# Patient Record
Sex: MALE | Race: WHITE | NOT HISPANIC OR LATINO | Employment: FULL TIME | URBAN - METROPOLITAN AREA
[De-identification: names, ages, dates, MRNs, and addresses within clinical notes are randomized per-mention and may not be internally consistent; named-entity substitution may affect disease eponyms.]

---

## 2019-11-30 ENCOUNTER — APPOINTMENT (OUTPATIENT)
Dept: RADIOLOGY | Facility: MEDICAL CENTER | Age: 54
End: 2019-11-30
Attending: EMERGENCY MEDICINE
Payer: OTHER MISCELLANEOUS

## 2019-11-30 ENCOUNTER — HOSPITAL ENCOUNTER (OUTPATIENT)
Facility: MEDICAL CENTER | Age: 54
DRG: 287 | End: 2019-11-30
Payer: OTHER MISCELLANEOUS

## 2019-11-30 ENCOUNTER — HOSPITAL ENCOUNTER (OUTPATIENT)
Facility: MEDICAL CENTER | Age: 54
End: 2019-11-30
Attending: EMERGENCY MEDICINE | Admitting: INTERNAL MEDICINE
Payer: OTHER MISCELLANEOUS

## 2019-11-30 ENCOUNTER — HOSPITAL ENCOUNTER (INPATIENT)
Facility: MEDICAL CENTER | Age: 54
LOS: 2 days | DRG: 287 | End: 2019-12-02
Attending: INTERNAL MEDICINE | Admitting: INTERNAL MEDICINE
Payer: OTHER MISCELLANEOUS

## 2019-11-30 VITALS
RESPIRATION RATE: 18 BRPM | HEIGHT: 73 IN | HEART RATE: 81 BPM | OXYGEN SATURATION: 93 % | WEIGHT: 222 LBS | DIASTOLIC BLOOD PRESSURE: 91 MMHG | BODY MASS INDEX: 29.42 KG/M2 | TEMPERATURE: 98.3 F | SYSTOLIC BLOOD PRESSURE: 135 MMHG

## 2019-11-30 DIAGNOSIS — R07.9 ACUTE CHEST PAIN: ICD-10-CM

## 2019-11-30 DIAGNOSIS — I21.4 NSTEMI (NON-ST ELEVATED MYOCARDIAL INFARCTION) (HCC): ICD-10-CM

## 2019-11-30 PROBLEM — E78.5 DYSLIPIDEMIA: Status: ACTIVE | Noted: 2019-11-30

## 2019-11-30 LAB
ALBUMIN SERPL BCP-MCNC: 4.5 G/DL (ref 3.2–4.9)
ALBUMIN/GLOB SERPL: 1.6 G/DL
ALP SERPL-CCNC: 57 U/L (ref 30–99)
ALT SERPL-CCNC: 38 U/L (ref 2–50)
ANION GAP SERPL CALC-SCNC: 13 MMOL/L (ref 0–11.9)
APTT PPP: 25.6 SEC (ref 24.7–36)
AST SERPL-CCNC: 27 U/L (ref 12–45)
BASOPHILS # BLD AUTO: 0.5 % (ref 0–1.8)
BASOPHILS # BLD: 0.03 K/UL (ref 0–0.12)
BILIRUB SERPL-MCNC: 0.3 MG/DL (ref 0.1–1.5)
BUN SERPL-MCNC: 24 MG/DL (ref 8–22)
CALCIUM SERPL-MCNC: 9.9 MG/DL (ref 8.4–10.2)
CHLORIDE SERPL-SCNC: 101 MMOL/L (ref 96–112)
CO2 SERPL-SCNC: 25 MMOL/L (ref 20–33)
CREAT SERPL-MCNC: 0.92 MG/DL (ref 0.5–1.4)
EKG IMPRESSION: NORMAL
EOSINOPHIL # BLD AUTO: 0.07 K/UL (ref 0–0.51)
EOSINOPHIL NFR BLD: 1.3 % (ref 0–6.9)
ERYTHROCYTE [DISTWIDTH] IN BLOOD BY AUTOMATED COUNT: 39.8 FL (ref 35.9–50)
GLOBULIN SER CALC-MCNC: 2.9 G/DL (ref 1.9–3.5)
GLUCOSE SERPL-MCNC: 128 MG/DL (ref 65–99)
HCT VFR BLD AUTO: 46.7 % (ref 42–52)
HGB BLD-MCNC: 16 G/DL (ref 14–18)
IMM GRANULOCYTES # BLD AUTO: 0.02 K/UL (ref 0–0.11)
IMM GRANULOCYTES NFR BLD AUTO: 0.4 % (ref 0–0.9)
INR PPP: 1 (ref 0.87–1.13)
LYMPHOCYTES # BLD AUTO: 1.6 K/UL (ref 1–4.8)
LYMPHOCYTES NFR BLD: 28.9 % (ref 22–41)
MCH RBC QN AUTO: 30.6 PG (ref 27–33)
MCHC RBC AUTO-ENTMCNC: 34.3 G/DL (ref 33.7–35.3)
MCV RBC AUTO: 89.3 FL (ref 81.4–97.8)
MONOCYTES # BLD AUTO: 0.93 K/UL (ref 0–0.85)
MONOCYTES NFR BLD AUTO: 16.8 % (ref 0–13.4)
NEUTROPHILS # BLD AUTO: 2.88 K/UL (ref 1.82–7.42)
NEUTROPHILS NFR BLD: 52.1 % (ref 44–72)
NRBC # BLD AUTO: 0 K/UL
NRBC BLD-RTO: 0 /100 WBC
PLATELET # BLD AUTO: 165 K/UL (ref 164–446)
PMV BLD AUTO: 10 FL (ref 9–12.9)
POTASSIUM SERPL-SCNC: 4.6 MMOL/L (ref 3.6–5.5)
PROT SERPL-MCNC: 7.4 G/DL (ref 6–8.2)
PROTHROMBIN TIME: 13.3 SEC (ref 12–14.6)
RBC # BLD AUTO: 5.23 M/UL (ref 4.7–6.1)
SODIUM SERPL-SCNC: 139 MMOL/L (ref 135–145)
TROPONIN T SERPL-MCNC: 13 NG/L (ref 6–19)
TROPONIN T SERPL-MCNC: 98 NG/L (ref 6–19)
WBC # BLD AUTO: 5.5 K/UL (ref 4.8–10.8)

## 2019-11-30 PROCEDURE — 700102 HCHG RX REV CODE 250 W/ 637 OVERRIDE(OP): Performed by: INTERNAL MEDICINE

## 2019-11-30 PROCEDURE — 96374 THER/PROPH/DIAG INJ IV PUSH: CPT

## 2019-11-30 PROCEDURE — A9270 NON-COVERED ITEM OR SERVICE: HCPCS | Performed by: INTERNAL MEDICINE

## 2019-11-30 PROCEDURE — 84484 ASSAY OF TROPONIN QUANT: CPT

## 2019-11-30 PROCEDURE — 96376 TX/PRO/DX INJ SAME DRUG ADON: CPT

## 2019-11-30 PROCEDURE — 85730 THROMBOPLASTIN TIME PARTIAL: CPT

## 2019-11-30 PROCEDURE — 36415 COLL VENOUS BLD VENIPUNCTURE: CPT

## 2019-11-30 PROCEDURE — 770020 HCHG ROOM/CARE - TELE (206)

## 2019-11-30 PROCEDURE — 93005 ELECTROCARDIOGRAM TRACING: CPT | Performed by: EMERGENCY MEDICINE

## 2019-11-30 PROCEDURE — 700111 HCHG RX REV CODE 636 W/ 250 OVERRIDE (IP): Performed by: INTERNAL MEDICINE

## 2019-11-30 PROCEDURE — 99285 EMERGENCY DEPT VISIT HI MDM: CPT

## 2019-11-30 PROCEDURE — 80053 COMPREHEN METABOLIC PANEL: CPT

## 2019-11-30 PROCEDURE — G0378 HOSPITAL OBSERVATION PER HR: HCPCS

## 2019-11-30 PROCEDURE — 85610 PROTHROMBIN TIME: CPT

## 2019-11-30 PROCEDURE — 99255 IP/OBS CONSLTJ NEW/EST HI 80: CPT | Performed by: INTERNAL MEDICINE

## 2019-11-30 PROCEDURE — 99220 PR INITIAL OBSERVATION CARE,LEVL III: CPT | Performed by: INTERNAL MEDICINE

## 2019-11-30 PROCEDURE — 71045 X-RAY EXAM CHEST 1 VIEW: CPT

## 2019-11-30 PROCEDURE — 85025 COMPLETE CBC W/AUTO DIFF WBC: CPT

## 2019-11-30 PROCEDURE — 93005 ELECTROCARDIOGRAM TRACING: CPT | Performed by: INTERNAL MEDICINE

## 2019-11-30 RX ORDER — PROMETHAZINE HYDROCHLORIDE 25 MG/1
12.5-25 SUPPOSITORY RECTAL EVERY 4 HOURS PRN
Status: DISCONTINUED | OUTPATIENT
Start: 2019-11-30 | End: 2019-11-30 | Stop reason: HOSPADM

## 2019-11-30 RX ORDER — ATORVASTATIN CALCIUM 80 MG/1
80 TABLET, FILM COATED ORAL DAILY
Status: DISCONTINUED | OUTPATIENT
Start: 2019-11-30 | End: 2019-12-02

## 2019-11-30 RX ORDER — ASPIRIN 81 MG/1
324 TABLET, CHEWABLE ORAL DAILY
Status: DISCONTINUED | OUTPATIENT
Start: 2019-11-30 | End: 2019-12-01

## 2019-11-30 RX ORDER — ENALAPRILAT 1.25 MG/ML
1.25 INJECTION INTRAVENOUS EVERY 6 HOURS PRN
Status: DISCONTINUED | OUTPATIENT
Start: 2019-11-30 | End: 2019-11-30 | Stop reason: HOSPADM

## 2019-11-30 RX ORDER — ONDANSETRON 4 MG/1
4 TABLET, ORALLY DISINTEGRATING ORAL EVERY 4 HOURS PRN
Status: CANCELLED | OUTPATIENT
Start: 2019-11-30

## 2019-11-30 RX ORDER — ASPIRIN 600 MG/1
300 SUPPOSITORY RECTAL DAILY
Status: DISCONTINUED | OUTPATIENT
Start: 2019-11-30 | End: 2019-11-30 | Stop reason: HOSPADM

## 2019-11-30 RX ORDER — PROCHLORPERAZINE EDISYLATE 5 MG/ML
5-10 INJECTION INTRAMUSCULAR; INTRAVENOUS EVERY 4 HOURS PRN
Status: CANCELLED | OUTPATIENT
Start: 2019-11-30

## 2019-11-30 RX ORDER — HEPARIN SODIUM 1000 [USP'U]/ML
7000 INJECTION, SOLUTION INTRAVENOUS; SUBCUTANEOUS ONCE
Status: COMPLETED | OUTPATIENT
Start: 2019-11-30 | End: 2019-11-30

## 2019-11-30 RX ORDER — CARBAMAZEPINE 200 MG/1
200 TABLET ORAL EVERY EVENING
Status: DISCONTINUED | OUTPATIENT
Start: 2019-11-30 | End: 2019-11-30 | Stop reason: HOSPADM

## 2019-11-30 RX ORDER — ACETAMINOPHEN 325 MG/1
650 TABLET ORAL EVERY 6 HOURS PRN
Status: DISCONTINUED | OUTPATIENT
Start: 2019-11-30 | End: 2019-12-02 | Stop reason: HOSPADM

## 2019-11-30 RX ORDER — ASPIRIN 325 MG
325 TABLET ORAL DAILY
Status: DISCONTINUED | OUTPATIENT
Start: 2019-11-30 | End: 2019-11-30 | Stop reason: HOSPADM

## 2019-11-30 RX ORDER — ACETAMINOPHEN 325 MG/1
650 TABLET ORAL EVERY 6 HOURS PRN
Status: DISCONTINUED | OUTPATIENT
Start: 2019-11-30 | End: 2019-11-30 | Stop reason: HOSPADM

## 2019-11-30 RX ORDER — PROMETHAZINE HYDROCHLORIDE 25 MG/1
12.5-25 TABLET ORAL EVERY 4 HOURS PRN
Status: CANCELLED | OUTPATIENT
Start: 2019-11-30

## 2019-11-30 RX ORDER — PROMETHAZINE HYDROCHLORIDE 25 MG/1
12.5-25 TABLET ORAL EVERY 4 HOURS PRN
Status: DISCONTINUED | OUTPATIENT
Start: 2019-11-30 | End: 2019-12-02 | Stop reason: HOSPADM

## 2019-11-30 RX ORDER — ONDANSETRON 4 MG/1
4 TABLET, ORALLY DISINTEGRATING ORAL EVERY 4 HOURS PRN
Status: DISCONTINUED | OUTPATIENT
Start: 2019-11-30 | End: 2019-12-02 | Stop reason: HOSPADM

## 2019-11-30 RX ORDER — PROMETHAZINE HYDROCHLORIDE 25 MG/1
12.5-25 SUPPOSITORY RECTAL EVERY 4 HOURS PRN
Status: DISCONTINUED | OUTPATIENT
Start: 2019-11-30 | End: 2019-12-02 | Stop reason: HOSPADM

## 2019-11-30 RX ORDER — PROCHLORPERAZINE EDISYLATE 5 MG/ML
5-10 INJECTION INTRAMUSCULAR; INTRAVENOUS EVERY 4 HOURS PRN
Status: DISCONTINUED | OUTPATIENT
Start: 2019-11-30 | End: 2019-11-30 | Stop reason: HOSPADM

## 2019-11-30 RX ORDER — ONDANSETRON 2 MG/ML
4 INJECTION INTRAMUSCULAR; INTRAVENOUS EVERY 4 HOURS PRN
Status: DISCONTINUED | OUTPATIENT
Start: 2019-11-30 | End: 2019-12-02 | Stop reason: HOSPADM

## 2019-11-30 RX ORDER — HEPARIN SODIUM 5000 [USP'U]/100ML
INJECTION, SOLUTION INTRAVENOUS CONTINUOUS
Status: DISCONTINUED | OUTPATIENT
Start: 2019-11-30 | End: 2019-11-30 | Stop reason: HOSPADM

## 2019-11-30 RX ORDER — ENALAPRILAT 1.25 MG/ML
1.25 INJECTION INTRAVENOUS EVERY 6 HOURS PRN
Status: DISCONTINUED | OUTPATIENT
Start: 2019-11-30 | End: 2019-12-02 | Stop reason: HOSPADM

## 2019-11-30 RX ORDER — CARBAMAZEPINE 200 MG/1
200 TABLET ORAL EVERY EVENING
Status: CANCELLED | OUTPATIENT
Start: 2019-11-30

## 2019-11-30 RX ORDER — ASPIRIN 81 MG/1
324 TABLET, CHEWABLE ORAL DAILY
Status: CANCELLED | OUTPATIENT
Start: 2019-12-01

## 2019-11-30 RX ORDER — ASPIRIN 325 MG
325 TABLET ORAL DAILY
Status: DISCONTINUED | OUTPATIENT
Start: 2019-11-30 | End: 2019-12-01

## 2019-11-30 RX ORDER — ACETAMINOPHEN 325 MG/1
650 TABLET ORAL EVERY 6 HOURS PRN
Status: CANCELLED | OUTPATIENT
Start: 2019-11-30

## 2019-11-30 RX ORDER — PROCHLORPERAZINE EDISYLATE 5 MG/ML
5-10 INJECTION INTRAMUSCULAR; INTRAVENOUS EVERY 4 HOURS PRN
Status: DISCONTINUED | OUTPATIENT
Start: 2019-11-30 | End: 2019-12-02 | Stop reason: HOSPADM

## 2019-11-30 RX ORDER — ONDANSETRON 4 MG/1
4 TABLET, ORALLY DISINTEGRATING ORAL EVERY 4 HOURS PRN
Status: DISCONTINUED | OUTPATIENT
Start: 2019-11-30 | End: 2019-11-30 | Stop reason: HOSPADM

## 2019-11-30 RX ORDER — ONDANSETRON 2 MG/ML
4 INJECTION INTRAMUSCULAR; INTRAVENOUS EVERY 4 HOURS PRN
Status: CANCELLED | OUTPATIENT
Start: 2019-11-30

## 2019-11-30 RX ORDER — REGADENOSON 0.08 MG/ML
0.4 INJECTION, SOLUTION INTRAVENOUS
Status: DISCONTINUED | OUTPATIENT
Start: 2019-11-30 | End: 2019-11-30

## 2019-11-30 RX ORDER — ASPIRIN 300 MG/1
300 SUPPOSITORY RECTAL DAILY
Status: DISCONTINUED | OUTPATIENT
Start: 2019-11-30 | End: 2019-12-01

## 2019-11-30 RX ORDER — ASPIRIN 325 MG
325 TABLET ORAL DAILY
Status: CANCELLED | OUTPATIENT
Start: 2019-12-01

## 2019-11-30 RX ORDER — ASPIRIN 600 MG/1
300 SUPPOSITORY RECTAL DAILY
Status: CANCELLED | OUTPATIENT
Start: 2019-12-01

## 2019-11-30 RX ORDER — HEPARIN SODIUM 1000 [USP'U]/ML
3800 INJECTION, SOLUTION INTRAVENOUS; SUBCUTANEOUS PRN
Status: DISCONTINUED | OUTPATIENT
Start: 2019-11-30 | End: 2019-11-30 | Stop reason: HOSPADM

## 2019-11-30 RX ORDER — ASPIRIN 81 MG/1
324 TABLET, CHEWABLE ORAL DAILY
Status: DISCONTINUED | OUTPATIENT
Start: 2019-11-30 | End: 2019-11-30 | Stop reason: HOSPADM

## 2019-11-30 RX ORDER — PROMETHAZINE HYDROCHLORIDE 25 MG/1
12.5-25 SUPPOSITORY RECTAL EVERY 4 HOURS PRN
Status: CANCELLED | OUTPATIENT
Start: 2019-11-30

## 2019-11-30 RX ORDER — CARBAMAZEPINE 200 MG/1
200 TABLET ORAL EVERY EVENING
COMMUNITY
End: 2020-02-28 | Stop reason: SDUPTHER

## 2019-11-30 RX ORDER — TADALAFIL 10 MG
10 TABLET ORAL PRN
COMMUNITY
End: 2021-08-09 | Stop reason: SDUPTHER

## 2019-11-30 RX ORDER — ONDANSETRON 2 MG/ML
4 INJECTION INTRAMUSCULAR; INTRAVENOUS EVERY 4 HOURS PRN
Status: DISCONTINUED | OUTPATIENT
Start: 2019-11-30 | End: 2019-11-30 | Stop reason: HOSPADM

## 2019-11-30 RX ORDER — HEPARIN SODIUM 1000 [USP'U]/ML
3800 INJECTION, SOLUTION INTRAVENOUS; SUBCUTANEOUS PRN
Status: DISCONTINUED | OUTPATIENT
Start: 2019-11-30 | End: 2019-12-01

## 2019-11-30 RX ORDER — ENALAPRILAT 1.25 MG/ML
1.25 INJECTION INTRAVENOUS EVERY 6 HOURS PRN
Status: CANCELLED | OUTPATIENT
Start: 2019-11-30

## 2019-11-30 RX ORDER — CARBAMAZEPINE 200 MG/1
200 TABLET ORAL EVERY EVENING
Status: DISCONTINUED | OUTPATIENT
Start: 2019-12-01 | End: 2019-12-02 | Stop reason: HOSPADM

## 2019-11-30 RX ORDER — HEPARIN SODIUM 5000 [USP'U]/100ML
INJECTION, SOLUTION INTRAVENOUS CONTINUOUS
Status: DISCONTINUED | OUTPATIENT
Start: 2019-11-30 | End: 2019-12-01

## 2019-11-30 RX ORDER — PROMETHAZINE HYDROCHLORIDE 25 MG/1
12.5-25 TABLET ORAL EVERY 4 HOURS PRN
Status: DISCONTINUED | OUTPATIENT
Start: 2019-11-30 | End: 2019-11-30 | Stop reason: HOSPADM

## 2019-11-30 RX ORDER — AMINOPHYLLINE 25 MG/ML
100 INJECTION, SOLUTION INTRAVENOUS
Status: DISCONTINUED | OUTPATIENT
Start: 2019-11-30 | End: 2019-11-30 | Stop reason: HOSPADM

## 2019-11-30 RX ADMIN — HEPARIN SODIUM 25000 UNITS: 5000 INJECTION, SOLUTION INTRAVENOUS at 18:14

## 2019-11-30 RX ADMIN — CARBAMAZEPINE 200 MG: 200 TABLET ORAL at 18:09

## 2019-11-30 RX ADMIN — ATORVASTATIN CALCIUM 80 MG: 80 TABLET, FILM COATED ORAL at 22:37

## 2019-11-30 RX ADMIN — ASPIRIN 325 MG: 325 TABLET, FILM COATED ORAL at 21:13

## 2019-11-30 RX ADMIN — HEPARIN SODIUM 7000 UNITS: 1000 INJECTION, SOLUTION INTRAVENOUS; SUBCUTANEOUS at 18:10

## 2019-11-30 SDOH — HEALTH STABILITY: MENTAL HEALTH: HOW OFTEN DO YOU HAVE 6 OR MORE DRINKS ON ONE OCCASION?: NEVER

## 2019-11-30 SDOH — ECONOMIC STABILITY: TRANSPORTATION INSECURITY
IN THE PAST 12 MONTHS, HAS THE LACK OF TRANSPORTATION KEPT YOU FROM MEDICAL APPOINTMENTS OR FROM GETTING MEDICATIONS?: NO

## 2019-11-30 SDOH — ECONOMIC STABILITY: FOOD INSECURITY: WITHIN THE PAST 12 MONTHS, THE FOOD YOU BOUGHT JUST DIDN'T LAST AND YOU DIDN'T HAVE MONEY TO GET MORE.: NEVER TRUE

## 2019-11-30 SDOH — HEALTH STABILITY: MENTAL HEALTH: HOW MANY STANDARD DRINKS CONTAINING ALCOHOL DO YOU HAVE ON A TYPICAL DAY?: 1 OR 2

## 2019-11-30 SDOH — ECONOMIC STABILITY: FOOD INSECURITY: WITHIN THE PAST 12 MONTHS, YOU WORRIED THAT YOUR FOOD WOULD RUN OUT BEFORE YOU GOT MONEY TO BUY MORE.: NEVER TRUE

## 2019-11-30 SDOH — HEALTH STABILITY: MENTAL HEALTH: HOW OFTEN DO YOU HAVE A DRINK CONTAINING ALCOHOL?: MONTHLY OR LESS

## 2019-11-30 SDOH — ECONOMIC STABILITY: TRANSPORTATION INSECURITY
IN THE PAST 12 MONTHS, HAS LACK OF TRANSPORTATION KEPT YOU FROM MEETINGS, WORK, OR FROM GETTING THINGS NEEDED FOR DAILY LIVING?: NO

## 2019-11-30 ASSESSMENT — COGNITIVE AND FUNCTIONAL STATUS - GENERAL
SUGGESTED CMS G CODE MODIFIER DAILY ACTIVITY: CH
MOBILITY SCORE: 24
SUGGESTED CMS G CODE MODIFIER MOBILITY: CH
MOBILITY SCORE: 24
DAILY ACTIVITIY SCORE: 24
DAILY ACTIVITIY SCORE: 24
SUGGESTED CMS G CODE MODIFIER DAILY ACTIVITY: CH
SUGGESTED CMS G CODE MODIFIER MOBILITY: CH

## 2019-11-30 ASSESSMENT — LIFESTYLE VARIABLES
EVER FELT BAD OR GUILTY ABOUT YOUR DRINKING: NO
EVER_SMOKED: NEVER
HAVE PEOPLE ANNOYED YOU BY CRITICIZING YOUR DRINKING: NO
HOW MANY TIMES IN THE PAST YEAR HAVE YOU HAD 5 OR MORE DRINKS IN A DAY: 0
ALCOHOL_USE: NO
TOTAL SCORE: 0
AVERAGE NUMBER OF DAYS PER WEEK YOU HAVE A DRINK CONTAINING ALCOHOL: 0
TOTAL SCORE: 0
EVER_SMOKED: NEVER
EVER HAD A DRINK FIRST THING IN THE MORNING TO STEADY YOUR NERVES TO GET RID OF A HANGOVER: NO
CONSUMPTION TOTAL: NEGATIVE
TOTAL SCORE: 0
HOW MANY TIMES IN THE PAST YEAR HAVE YOU HAD 5 OR MORE DRINKS IN A DAY: 0
AVERAGE NUMBER OF DAYS PER WEEK YOU HAVE A DRINK CONTAINING ALCOHOL: 0
TOTAL SCORE: 0
HAVE PEOPLE ANNOYED YOU BY CRITICIZING YOUR DRINKING: NO
ALCOHOL_USE: YES
ON A TYPICAL DAY WHEN YOU DRINK ALCOHOL HOW MANY DRINKS DO YOU HAVE: 2
EVER FELT BAD OR GUILTY ABOUT YOUR DRINKING: NO
CONSUMPTION TOTAL: NEGATIVE
DOES PATIENT WANT TO STOP DRINKING: NO
TOTAL SCORE: 0
DOES PATIENT WANT TO STOP DRINKING: NO
TOTAL SCORE: 0
HAVE YOU EVER FELT YOU SHOULD CUT DOWN ON YOUR DRINKING: NO
ON A TYPICAL DAY WHEN YOU DRINK ALCOHOL HOW MANY DRINKS DO YOU HAVE: 0
EVER HAD A DRINK FIRST THING IN THE MORNING TO STEADY YOUR NERVES TO GET RID OF A HANGOVER: NO
HAVE YOU EVER FELT YOU SHOULD CUT DOWN ON YOUR DRINKING: NO

## 2019-11-30 ASSESSMENT — ENCOUNTER SYMPTOMS
ORTHOPNEA: 0
CONSTIPATION: 0
SHORTNESS OF BREATH: 0
FEVER: 0
NERVOUS/ANXIOUS: 0
VOMITING: 0
HEADACHES: 1
NAUSEA: 0
TINGLING: 1
MYALGIAS: 0
CHILLS: 0
PALPITATIONS: 0
FOCAL WEAKNESS: 0
STRIDOR: 0
SEIZURES: 0
WHEEZING: 0
DIAPHORESIS: 0
ABDOMINAL PAIN: 0
SORE THROAT: 0
INSOMNIA: 0
HEARTBURN: 0
TREMORS: 0
DIZZINESS: 0
BACK PAIN: 0
COUGH: 0

## 2019-11-30 ASSESSMENT — PATIENT HEALTH QUESTIONNAIRE - PHQ9
1. LITTLE INTEREST OR PLEASURE IN DOING THINGS: NOT AT ALL
2. FEELING DOWN, DEPRESSED, IRRITABLE, OR HOPELESS: NOT AT ALL
1. LITTLE INTEREST OR PLEASURE IN DOING THINGS: NOT AT ALL
SUM OF ALL RESPONSES TO PHQ9 QUESTIONS 1 AND 2: 0
SUM OF ALL RESPONSES TO PHQ9 QUESTIONS 1 AND 2: 0
2. FEELING DOWN, DEPRESSED, IRRITABLE, OR HOPELESS: NOT AT ALL

## 2019-11-30 NOTE — ASSESSMENT & PLAN NOTE
The patient states he stopped statin therapy as it did not lower his cholesterol, I will repeat a fasting cholesterol test in the morning to see where his levels are

## 2019-11-30 NOTE — PROGRESS NOTES
2 RN skin check complete with Mervat  Devices in place: none  No areas for concern. Patient ambulatory.

## 2019-11-30 NOTE — H&P
"Hospital Medicine History & Physical Note    Date of Service  11/30/2019    Primary Care Physician  None    Consultants  none    Code Status  full    Chief Complaint  Chest pain    History of Presenting Illness  54 y.o. male who presented 11/30/2019 with chest pain while breaking up ice in front of his house. He was shoveling snow yesterday and states it was much more strenuous and had not chest pain all day yesterday. Today he was breaking up ice on the ground and had acute onset of mid sternal chest pain that was pressure-like in sensation and radiated into both of his arms. In his forearms  He felt tingling and an \"aching\" sensation. He had some flushing of his face. He denies any diaphoresis, shortness of breath or nausea. He did have a mild headache with the incident.  Symptoms lasted 10 minutes and after taking aspirin given to him by paramedics his pain started to improve. He was then given nitroglycerin and his pain slowly resolved completely. Overall it lasted about 20 minutes. He denies ever having pain like this in the past.    Review of Systems  Review of Systems   Constitutional: Negative for chills, diaphoresis, fever and malaise/fatigue.        Flushing with chest pain   HENT: Negative for congestion and sore throat.    Respiratory: Negative for cough, shortness of breath, wheezing and stridor.    Cardiovascular: Positive for chest pain. Negative for palpitations, orthopnea and leg swelling.   Gastrointestinal: Negative for abdominal pain, constipation, heartburn, nausea and vomiting.   Genitourinary: Negative for frequency, hematuria and urgency.   Musculoskeletal: Negative for back pain and myalgias.        Arm aching and tingling with chest pain   Skin: Negative for itching and rash.   Neurological: Positive for tingling and headaches. Negative for dizziness, tremors, focal weakness and seizures.   Psychiatric/Behavioral: The patient is not nervous/anxious and does not have insomnia.    All other " systems reviewed and are negative.      Past Medical History   has a past medical history of Seizure (HCC).seizure due to a cerebral cavernous angioma    Surgical History  No surgery    Family History  Father had heart disease diagnosed in his 50's, paternal grandfather had heart disease    Social History   reports that he has never smoked. He does not have any smokeless tobacco history on file. He reports current alcohol use. He reports that he does not use drugs.He drinks one glass of wine weekly    Allergies  No Known Allergies    Medications  Prior to Admission Medications   Prescriptions Last Dose Informant Patient Reported? Taking?   Multiple Vitamins-Minerals (MULTIVITAMIN PO) 11/26/2019 at am Patient Yes Yes   Sig: Take 1 Tab by mouth every day.   carBAMazepine (TEGRETOL) 200 MG Tab 11/29/2019 at 2000 Patient Yes Yes   Sig: Take 200 mg by mouth every evening.   tadalafil (CIALIS) 10 MG tablet 11/29/2019 at 1900 Patient Yes Yes   Sig: Take 10 mg by mouth as needed for Erectile Dysfunction.      Facility-Administered Medications: None       Physical Exam  Temp:  [36.6 °C (97.9 °F)] 36.6 °C (97.9 °F)  Pulse:  [80-88] 88  Resp:  [12-51] 21  BP: (130-152)/(62-95) 152/84  SpO2:  [92 %-96 %] 96 %    Physical Exam  Vitals signs and nursing note reviewed.   Constitutional:       Appearance: He is not ill-appearing or diaphoretic.   HENT:      Head: Atraumatic.      Comments: Scar over left forehead      Nose: No congestion or rhinorrhea.      Mouth/Throat:      Mouth: Mucous membranes are dry.      Pharynx: No posterior oropharyngeal erythema.   Eyes:      Extraocular Movements: Extraocular movements intact.      Pupils: Pupils are equal, round, and reactive to light.   Neck:      Musculoskeletal: Neck supple. No muscular tenderness.   Cardiovascular:      Rate and Rhythm: Normal rate and regular rhythm.      Pulses: Normal pulses.      Heart sounds: Normal heart sounds. No murmur.   Pulmonary:      Effort: Pulmonary  effort is normal. No respiratory distress.      Breath sounds: Normal breath sounds. No wheezing.   Abdominal:      General: There is no distension.      Palpations: Abdomen is soft. There is no mass.      Tenderness: There is no tenderness.   Musculoskeletal:         General: No swelling, tenderness or signs of injury.   Skin:     General: Skin is warm and dry.      Coloration: Skin is not jaundiced.   Neurological:      General: No focal deficit present.      Mental Status: He is alert and oriented to person, place, and time.      Motor: No weakness.      Coordination: Coordination normal.   Psychiatric:         Mood and Affect: Mood normal.         Behavior: Behavior normal.         Thought Content: Thought content normal.         Laboratory:  Recent Labs     11/30/19  1040   WBC 5.5   RBC 5.23   HEMOGLOBIN 16.0   HEMATOCRIT 46.7   MCV 89.3   MCH 30.6   MCHC 34.3   RDW 39.8   PLATELETCT 165   MPV 10.0     Recent Labs     11/30/19  1040   SODIUM 139   POTASSIUM 4.6   CHLORIDE 101   CO2 25   GLUCOSE 128*   BUN 24*   CREATININE 0.92   CALCIUM 9.9     Recent Labs     11/30/19  1040   ALTSGPT 38   ASTSGOT 27   ALKPHOSPHAT 57   TBILIRUBIN 0.3   GLUCOSE 128*         No results for input(s): NTPROBNP in the last 72 hours.      Recent Labs     11/30/19  1040   TROPONINT 13       Urinalysis:    No results found     Imaging:  DX-CHEST-PORTABLE (1 VIEW)   Final Result      Negative single view of the chest.      NM-CARDIAC STRESS TEST    (Results Pending)         Assessment/Plan:  I anticipate this patient will require at least two midnights for appropriate medical management, necessitating inpatient admission.    Chest pain  Assessment & Plan  Concerning for inducible ischemia given the patient's symptoms  He did take cialis last night at 7:30pm and this must be 72 hours later for a chemical stress test, I reviewed this with nuclear medicine, Eros and will order for a treadmill test with images which can be done tomorrow as  he also drank caffeinated coffee this morning at 7am  I will repeat a troponin as his first one is normal to ensure no active ischemia    Dyslipidemia  Assessment & Plan  The patient states he stopped statin therapy as it did not lower his cholesterol, I will repeat a fasting cholesterol test in the morning to see where his levels are    Addendum: the patient had ekg changes with interventricular conduction delay as reviewed by myself and rising troponin to the 90's consistent with a type 2 MI, I called Dr. Kapil Deng who agrees with heparin drip and transfer to [facility] for cardiac catheterization    VTE prophylaxis: ambulation

## 2019-11-30 NOTE — ASSESSMENT & PLAN NOTE
Concerning for inducible ischemia given the patient's symptoms  He did take cialis last night at 7:30pm and this must be 72 hours later for a chemical stress test, I reviewed this with nuclear medicine, Eros and will order for a treadmill test with images which can be done tomorrow as he also drank caffeinated coffee this morning at 7am  I will repeat a troponin as his first one is normal to ensure no active ischemia

## 2019-11-30 NOTE — ED PROVIDER NOTES
ED Provider Note    CHIEF COMPLAINT  Chief Complaint   Patient presents with   • Chest Pain     chest pain started while shoveling snow       [facility]  Jaja Lugo is a 54 y.o. male who presents with a history of high cholesterol, he was taking a statin but it was not effective for him and he is currently not on high cholesterol medications, he denies any other medical history, he does not smoke, he reports his father  from heart disease but his father was also diabetic.  The patient was shoveling snow today and had substernal chest pain that felt like a cramp that lasted at least 10 to 15 minutes.  He was diaphoretic but was not sure if that was from coming in out of the cold, he denied shortness of breath or nausea.  He called 911 and was given aspirin and nitro.  Currently he is chest pain-free.  He has never had a stress test before.  He also describes that he had tingling in both his hands during the chest pain.  He denies hemoptysis, he denies leg swelling, he denies any focal neurologic deficits besides tingling in both hands.    REVIEW OF SYSTEMS  See HPI for further details. All other systems are negative.     PAST MEDICAL HISTORY   has a past medical history of Seizure (HCC).    SOCIAL HISTORY  Social History     Tobacco Use   • Smoking status: Never Smoker   Substance and Sexual Activity   • Alcohol use: Yes     Frequency: Monthly or less   • Drug use: Never   • Sexual activity: Not on file       SURGICAL HISTORY  patient denies any surgical history    CURRENT MEDICATIONS  Home Medications     Reviewed by Abi Keith (Pharmacy Tech) on 19 at 1056  Med List Status: Complete   Medication Last Dose Status   carBAMazepine (TEGRETOL) 200 MG Tab 2019 Active   Multiple Vitamins-Minerals (MULTIVITAMIN PO) 2019 Active   tadalafil (CIALIS) 10 MG tablet 2019 Active                  ALLERGIES  No Known Allergies    PHYSICAL EXAM  VITAL SIGNS: /79   Pulse 82   Temp 36.6  °C (97.9 °F) (Temporal)   Resp 12   Wt 95 kg (209 lb 7 oz)   SpO2 96%  @MIRELA[[id number]::@   Pulse ox interpretation: I interpret this pulse ox as normal.  Constitutional: Alert in no apparent distress.  HENT: No signs of trauma, Bilateral external ears normal, Nose normal.   Eyes: Pupils are equal and reactive, Conjunctiva normal, Non-icteric.   Neck: Normal range of motion, No tenderness, Supple, No stridor.   Lymphatic: No lymphadenopathy noted.   Cardiovascular: Regular rate and rhythm, no murmurs.   Thorax & Lungs: Normal breath sounds, No respiratory distress, No wheezing, No chest tenderness.   Abdomen: Bowel sounds normal, Soft, No tenderness, No masses, No pulsatile masses. No peritoneal signs.  Skin: Warm, Dry, No erythema, No rash.   Back: No bony tenderness, No CVA tenderness.   Extremities: Intact distal pulses, No edema, No tenderness, No cyanosis.  Musculoskeletal: Good range of motion in all major joints. No tenderness to palpation or major deformities noted.   Neurologic: Alert , Normal motor function, Normal sensory function, No focal deficits noted.   Psychiatric: Affect normal, Judgment normal, Mood normal.       DIAGNOSTIC STUDIES / PROCEDURES    EKG  This is a 12-lead EKG interpretation by myself.  It is normal sinus rhythm at a rate of 85.  The axis is normal.  The intervals are normal.  There is no ST elevation or depression.  My impression of this EKG, it does not indicate ischemia nor arrhythmia at this time.    LABS  Labs Reviewed   CBC WITH DIFFERENTIAL - Abnormal; Notable for the following components:       Result Value    Monocytes 16.80 (*)     Monos (Absolute) 0.93 (*)     All other components within normal limits   COMP METABOLIC PANEL - Abnormal; Notable for the following components:    Anion Gap 13.0 (*)     Glucose 128 (*)     Bun 24 (*)     All other components within normal limits   TROPONIN   ESTIMATED GFR         RADIOLOGY  DX-CHEST-PORTABLE (1 VIEW)   Final Result      Negative  single view of the chest.              COURSE & MEDICAL DECISION MAKING  Pertinent Labs & Imaging studies reviewed. (See chart for details)    Differential diagnosis: ACS, noncardiac chest pain    The patient was already given aspirin and nitro by paramedics.  He is currently chest pain-free.  He has risk for coronary disease.  His EKG does not show STEMI.    The patient's first troponin is negative, I spoke with Dr. Palak Foss who will admit the patient from the hospitalist team.  The patient is admitted in fair condition.    .    FINAL IMPRESSION  1. Acute chest pain                Electronically signed by: Justice Alcaraz, 11/30/2019 10:47 AM

## 2019-12-01 ENCOUNTER — APPOINTMENT (OUTPATIENT)
Dept: CARDIOLOGY | Facility: MEDICAL CENTER | Age: 54
DRG: 287 | End: 2019-12-01
Attending: NURSE PRACTITIONER
Payer: OTHER MISCELLANEOUS

## 2019-12-01 ENCOUNTER — APPOINTMENT (OUTPATIENT)
Dept: CARDIOLOGY | Facility: MEDICAL CENTER | Age: 54
DRG: 287 | End: 2019-12-01
Attending: INTERNAL MEDICINE
Payer: OTHER MISCELLANEOUS

## 2019-12-01 PROBLEM — G40.909 SEIZURE DISORDER (HCC): Status: ACTIVE | Noted: 2019-12-01

## 2019-12-01 LAB
APTT PPP: 103 SEC (ref 24.7–36)
APTT PPP: 105.5 SEC (ref 24.7–36)
CHOLEST SERPL-MCNC: 249 MG/DL (ref 100–199)
EKG IMPRESSION: NORMAL
HDLC SERPL-MCNC: 50 MG/DL
LDLC SERPL CALC-MCNC: 170 MG/DL
LV EJECT FRACT  99904: 65
LV EJECT FRACT MOD 2C 99903: 63.33
LV EJECT FRACT MOD 4C 99902: 59.34
LV EJECT FRACT MOD BP 99901: 60.29
TRIGL SERPL-MCNC: 145 MG/DL (ref 0–149)

## 2019-12-01 PROCEDURE — 93306 TTE W/DOPPLER COMPLETE: CPT

## 2019-12-01 PROCEDURE — 305387 CL-LEFT HEART CATHETERIZATION WITH POSSIBLE INTERVENTION

## 2019-12-01 PROCEDURE — B2111ZZ FLUOROSCOPY OF MULTIPLE CORONARY ARTERIES USING LOW OSMOLAR CONTRAST: ICD-10-PCS | Performed by: INTERNAL MEDICINE

## 2019-12-01 PROCEDURE — 93010 ELECTROCARDIOGRAM REPORT: CPT | Performed by: INTERNAL MEDICINE

## 2019-12-01 PROCEDURE — A9270 NON-COVERED ITEM OR SERVICE: HCPCS | Performed by: HOSPITALIST

## 2019-12-01 PROCEDURE — 99152 MOD SED SAME PHYS/QHP 5/>YRS: CPT | Performed by: INTERNAL MEDICINE

## 2019-12-01 PROCEDURE — 4A023N7 MEASUREMENT OF CARDIAC SAMPLING AND PRESSURE, LEFT HEART, PERCUTANEOUS APPROACH: ICD-10-PCS | Performed by: INTERNAL MEDICINE

## 2019-12-01 PROCEDURE — A9270 NON-COVERED ITEM OR SERVICE: HCPCS | Performed by: INTERNAL MEDICINE

## 2019-12-01 PROCEDURE — 700111 HCHG RX REV CODE 636 W/ 250 OVERRIDE (IP): Performed by: INTERNAL MEDICINE

## 2019-12-01 PROCEDURE — 93306 TTE W/DOPPLER COMPLETE: CPT | Mod: 26 | Performed by: INTERNAL MEDICINE

## 2019-12-01 PROCEDURE — 700102 HCHG RX REV CODE 250 W/ 637 OVERRIDE(OP): Performed by: HOSPITALIST

## 2019-12-01 PROCEDURE — 700111 HCHG RX REV CODE 636 W/ 250 OVERRIDE (IP)

## 2019-12-01 PROCEDURE — 85730 THROMBOPLASTIN TIME PARTIAL: CPT | Mod: 91

## 2019-12-01 PROCEDURE — B2151ZZ FLUOROSCOPY OF LEFT HEART USING LOW OSMOLAR CONTRAST: ICD-10-PCS | Performed by: INTERNAL MEDICINE

## 2019-12-01 PROCEDURE — 700101 HCHG RX REV CODE 250

## 2019-12-01 PROCEDURE — 770020 HCHG ROOM/CARE - TELE (206)

## 2019-12-01 PROCEDURE — 700102 HCHG RX REV CODE 250 W/ 637 OVERRIDE(OP): Performed by: INTERNAL MEDICINE

## 2019-12-01 PROCEDURE — 99233 SBSQ HOSP IP/OBS HIGH 50: CPT | Performed by: HOSPITALIST

## 2019-12-01 PROCEDURE — 80061 LIPID PANEL: CPT

## 2019-12-01 PROCEDURE — 93458 L HRT ARTERY/VENTRICLE ANGIO: CPT | Mod: 26 | Performed by: INTERNAL MEDICINE

## 2019-12-01 PROCEDURE — 700105 HCHG RX REV CODE 258: Performed by: INTERNAL MEDICINE

## 2019-12-01 PROCEDURE — 36415 COLL VENOUS BLD VENIPUNCTURE: CPT

## 2019-12-01 RX ORDER — SODIUM CHLORIDE 9 MG/ML
INJECTION, SOLUTION INTRAVENOUS CONTINUOUS
Status: CANCELLED | OUTPATIENT
Start: 2019-12-01

## 2019-12-01 RX ORDER — HEPARIN SODIUM,PORCINE 1000/ML
VIAL (ML) INJECTION
Status: COMPLETED
Start: 2019-12-01 | End: 2019-12-01

## 2019-12-01 RX ORDER — HEPARIN SODIUM 200 [USP'U]/100ML
INJECTION, SOLUTION INTRAVENOUS
Status: COMPLETED
Start: 2019-12-01 | End: 2019-12-01

## 2019-12-01 RX ORDER — MIDAZOLAM HYDROCHLORIDE 1 MG/ML
INJECTION INTRAMUSCULAR; INTRAVENOUS
Status: COMPLETED
Start: 2019-12-01 | End: 2019-12-01

## 2019-12-01 RX ORDER — SODIUM CHLORIDE 9 MG/ML
INJECTION, SOLUTION INTRAVENOUS CONTINUOUS
Status: DISCONTINUED | OUTPATIENT
Start: 2019-12-01 | End: 2019-12-02 | Stop reason: HOSPADM

## 2019-12-01 RX ORDER — ASPIRIN 81 MG/1
324 TABLET, CHEWABLE ORAL DAILY
Status: DISCONTINUED | OUTPATIENT
Start: 2019-12-01 | End: 2019-12-02

## 2019-12-01 RX ORDER — LABETALOL HYDROCHLORIDE 5 MG/ML
10 INJECTION, SOLUTION INTRAVENOUS
Status: DISCONTINUED | OUTPATIENT
Start: 2019-12-01 | End: 2019-12-02 | Stop reason: HOSPADM

## 2019-12-01 RX ORDER — ASPIRIN 300 MG/1
300 SUPPOSITORY RECTAL DAILY
Status: DISCONTINUED | OUTPATIENT
Start: 2019-12-01 | End: 2019-12-02

## 2019-12-01 RX ORDER — VERAPAMIL HYDROCHLORIDE 2.5 MG/ML
INJECTION, SOLUTION INTRAVENOUS
Status: COMPLETED
Start: 2019-12-01 | End: 2019-12-01

## 2019-12-01 RX ORDER — LIDOCAINE HYDROCHLORIDE 20 MG/ML
INJECTION, SOLUTION INFILTRATION; PERINEURAL
Status: COMPLETED
Start: 2019-12-01 | End: 2019-12-01

## 2019-12-01 RX ORDER — ASPIRIN 325 MG
325 TABLET ORAL DAILY
Status: DISCONTINUED | OUTPATIENT
Start: 2019-12-01 | End: 2019-12-02

## 2019-12-01 RX ADMIN — LIDOCAINE HYDROCHLORIDE: 20 INJECTION, SOLUTION INFILTRATION; PERINEURAL at 12:17

## 2019-12-01 RX ADMIN — NITROGLYCERIN 10 ML: 20 INJECTION INTRAVENOUS at 12:17

## 2019-12-01 RX ADMIN — SODIUM CHLORIDE: 9 INJECTION, SOLUTION INTRAVENOUS at 23:28

## 2019-12-01 RX ADMIN — HEPARIN SODIUM: 1000 INJECTION, SOLUTION INTRAVENOUS; SUBCUTANEOUS at 12:16

## 2019-12-01 RX ADMIN — MIDAZOLAM HYDROCHLORIDE 1 MG: 1 INJECTION, SOLUTION INTRAMUSCULAR; INTRAVENOUS at 12:39

## 2019-12-01 RX ADMIN — ACETAMINOPHEN 650 MG: 325 TABLET, FILM COATED ORAL at 17:34

## 2019-12-01 RX ADMIN — ENOXAPARIN SODIUM 40 MG: 100 INJECTION SUBCUTANEOUS at 17:07

## 2019-12-01 RX ADMIN — HEPARIN SODIUM 1250 UNITS: 5000 INJECTION, SOLUTION INTRAVENOUS at 10:51

## 2019-12-01 RX ADMIN — HEPARIN SODIUM 2000 UNITS: 200 INJECTION, SOLUTION INTRAVENOUS at 12:17

## 2019-12-01 RX ADMIN — FENTANYL CITRATE 100 MCG: 0.05 INJECTION, SOLUTION INTRAMUSCULAR; INTRAVENOUS at 12:24

## 2019-12-01 RX ADMIN — SODIUM CHLORIDE: 9 INJECTION, SOLUTION INTRAVENOUS at 13:33

## 2019-12-01 RX ADMIN — ASPIRIN 325 MG: 325 TABLET, FILM COATED ORAL at 20:11

## 2019-12-01 RX ADMIN — VERAPAMIL HYDROCHLORIDE 5 MG: 2.5 INJECTION INTRAVENOUS at 12:17

## 2019-12-01 RX ADMIN — MIDAZOLAM HYDROCHLORIDE 2 MG: 1 INJECTION, SOLUTION INTRAMUSCULAR; INTRAVENOUS at 12:24

## 2019-12-01 RX ADMIN — CARBAMAZEPINE 200 MG: 200 TABLET ORAL at 17:08

## 2019-12-01 ASSESSMENT — ENCOUNTER SYMPTOMS
EYE DISCHARGE: 0
COUGH: 0
LOSS OF CONSCIOUSNESS: 0
FEVER: 0
DIARRHEA: 0
SHORTNESS OF BREATH: 0
BLOOD IN STOOL: 0
SPEECH CHANGE: 0
PALPITATIONS: 0
DIZZINESS: 1
STRIDOR: 0
VOMITING: 0
SORE THROAT: 0
EYE REDNESS: 0
SPUTUM PRODUCTION: 0
EYE PAIN: 0
HEMOPTYSIS: 0
HALLUCINATIONS: 0
ABDOMINAL PAIN: 0
FLANK PAIN: 0
NERVOUS/ANXIOUS: 0
BRUISES/BLEEDS EASILY: 0
MYALGIAS: 0
SEIZURES: 0
CHILLS: 0
HEADACHES: 1
FOCAL WEAKNESS: 0

## 2019-12-01 NOTE — PROGRESS NOTES
Pt back from cath lab. Right radial cath site with TR band in place, CDI, soft. Monitors back on and confirmed with Kristen in monitor room

## 2019-12-01 NOTE — PROGRESS NOTES
"Patient had repeat 4 hr EKG this afternoon. V1 lead was concerning for possible changes with interpretation reading: \"borderline intraventricular conduction delay\". About that time his next troponin resulted as well. It had increased from 13 to 98. Pt was assessed and still denied chest pain. Dr Foss was notified. She consulted cardiology and patient transferred to [facility]. He was given a heparin bolus and drip was started before transfer. When Dr Foss came to assess patient, he reported 1/10 pain intermittently. Wife was at bedside and updated on transfer. Report called to Ray GIRARD at time of transfer.   "

## 2019-12-01 NOTE — PROGRESS NOTES
2 RN skin check complete with NERY Berry.  Devices in place: PIV, tele monitor.  Skin assessed under devices: Yes.  Confirmed pressure ulcers found on: N/A.  New potential pressure ulcers noted on: N/A. Wound consult not needed.  The following interventions in place: Pt turns self side to side, pillows in use for support and positioning, pt ambulatory    All skin is pink, blanching, and intact

## 2019-12-01 NOTE — PROGRESS NOTES
Hospital Medicine Daily Progress Note    Date of Service  12/1/2019    Chief Complaint  Chest pain    Hospital Course      54M, PMHx seizure dis admit w chest pain found to have NSTEMI, plan to go to cath lab on 12/1.        Interval Problem Update  Having intermittent chest pain however much improved compared to initial presentation.  Blood pressure within normal limits this morning.  Heart rate 80s-90s, saturating well on room air.  Has as needed morphine for pain.  Resume home antiseizure medication  Continue aspirin and heparin drip until cath is done  Plan for cath study today.  Potassium 4.6, creatinine 0.92.  I will order a BMP to follow on renal function after contrast exposure  Discussed with patient, patient's nurse, patient's wife and charge nurse.      Consultants/Specialty  Cardiology    Code Status  Full code    Disposition  To be determined, plan for cath on 12/1    Review of Systems  Review of Systems   Constitutional: Negative for chills and fever.   HENT: Negative for congestion and sore throat.    Eyes: Negative for pain, discharge and redness.   Respiratory: Negative for cough, hemoptysis, sputum production, shortness of breath (Resolved) and stridor.    Cardiovascular: Positive for chest pain (Improving). Negative for palpitations and leg swelling.   Gastrointestinal: Negative for abdominal pain, blood in stool, diarrhea and vomiting.   Genitourinary: Negative for flank pain, hematuria and urgency.   Musculoskeletal: Negative for myalgias.   Skin: Negative.    Neurological: Positive for dizziness and headaches. Negative for speech change, focal weakness, seizures and loss of consciousness.   Endo/Heme/Allergies: Does not bruise/bleed easily.   Psychiatric/Behavioral: Negative for hallucinations and suicidal ideas. The patient is not nervous/anxious.       Physical Exam  Temp:  [36.2 °C (97.2 °F)-36.8 °C (98.2 °F)] 36.7 °C (98 °F)  Pulse:  [76-92] 86  Resp:  [14-18] 18  BP: (113-138)/(72-99)  126/78  SpO2:  [93 %-98 %] 95 %    Physical Exam  Constitutional:       General: He is not in acute distress.     Appearance: He is not ill-appearing or diaphoretic.   HENT:      Head: Normocephalic.      Right Ear: External ear normal.      Left Ear: External ear normal.      Mouth/Throat:      Mouth: Mucous membranes are moist.      Pharynx: No oropharyngeal exudate or posterior oropharyngeal erythema.   Eyes:      General:         Right eye: No discharge.         Left eye: No discharge.   Neck:      Musculoskeletal: No neck rigidity or muscular tenderness.   Cardiovascular:      Rate and Rhythm: Normal rate and regular rhythm.      Heart sounds: No murmur. No friction rub. No gallop.    Pulmonary:      Effort: No respiratory distress.      Breath sounds: No stridor. No wheezing.   Abdominal:      General: Abdomen is flat. There is no distension.      Palpations: Abdomen is soft.      Tenderness: There is no tenderness.      Hernia: No hernia is present.   Musculoskeletal:         General: No swelling.      Right lower leg: No edema.      Left lower leg: No edema.   Skin:     General: Skin is warm and dry.      Findings: No rash.   Neurological:      General: No focal deficit present.      Mental Status: He is alert and oriented to person, place, and time.      Sensory: No sensory deficit.      Motor: No weakness.   Psychiatric:         Mood and Affect: Mood normal.       Fluids    Intake/Output Summary (Last 24 hours) at 12/1/2019 1816  Last data filed at 12/1/2019 1730  Gross per 24 hour   Intake 450 ml   Output --   Net 450 ml       Laboratory  Recent Labs     11/30/19  1040   WBC 5.5   RBC 5.23   HEMOGLOBIN 16.0   HEMATOCRIT 46.7   MCV 89.3   MCH 30.6   MCHC 34.3   RDW 39.8   PLATELETCT 165   MPV 10.0     Recent Labs     11/30/19  1040   SODIUM 139   POTASSIUM 4.6   CHLORIDE 101   CO2 25   GLUCOSE 128*   BUN 24*   CREATININE 0.92   CALCIUM 9.9     Recent Labs     11/30/19  1745 12/01/19  0013 12/01/19  0615    APTT 25.6 105.5* 103.0*   INR 1.00  --   --          Recent Labs     12/01/19  0013   TRIGLYCERIDE 145   HDL 50   *       Imaging  EC-ECHOCARDIOGRAM COMPLETE W/O CONT   Final Result      CL-LEFT HEART CATHETERIZATION WITH POSSIBLE INTERVENTION    (Results Pending)      Assessment/Plan  * Chest pain- (present on admission)  Assessment & Plan  Concern for acute coronary syndrome.  Started on aspirin and heparin  Cardiology consulted, plan for cath study.    History of seizure disorder (HCC)- (present on admission)  Assessment & Plan  Controlled with carbamazepine.  I will continue    Dyslipidemia- (present on admission)  Assessment & Plan  Resume home statin.     VTE prophylaxis: Currently on heparin drip

## 2019-12-01 NOTE — PROGRESS NOTES
Critical PTT lab result was 105.5 at 01:58. This value is within the defined limits of the Heparin Weight Based Protocol ordered by the physician for this patient. Heparin Weight Based Protocol will be followed for any adjustments, physician was not notified per protocol instructions.

## 2019-12-01 NOTE — PROGRESS NOTES
Patient transferred via [facility] to [facility]. Report called to Mireya. ERA upon transfer. Wife at bedside. Heparin bolus given and drip started.     Heart monitor removed prior to DC

## 2019-12-01 NOTE — PROGRESS NOTES
Critical PTT lab result was 103 at 0640. This value is within the defined limits of the Heparin Weight Based Protocol ordered by the physician for this patient. Heparin Weight Based Protocol will be followed for any adjustments, physician was not notified per protocol instructions.

## 2019-12-01 NOTE — PROGRESS NOTES
Tele Shift Summary    NV: 0.16  QRS: 0.08  QT: 0.36    HR 70-80's, no ectopy     Per KENDELL Ugarte

## 2019-12-01 NOTE — DISCHARGE SUMMARY
"Discharge Summary    CHIEF COMPLAINT ON ADMISSION  Chief Complaint   Patient presents with   • Chest Pain     chest pain started while shoveling snow       Reason for Admission  Chest Pain     Admission Date  11/30/2019    CODE STATUS  Full Code    HPI & HOSPITAL COURSE  54 y.o. male who presented 11/30/2019 with chest pain while breaking up ice in front of his house. He was shoveling snow yesterday and states it was much more strenuous and had not chest pain all day yesterday. Today he was breaking up ice on the ground and had acute onset of mid sternal chest pain that was pressure-like in sensation and radiated into both of his arms. In his forearms  He felt tingling and an \"aching\" sensation. He had some flushing of his face. He denies any diaphoresis, shortness of breath or nausea. He did have a mild headache with the incident.  Symptoms lasted 10 minutes and after taking aspirin given to him by paramedics his pain started to improve. He was then given nitroglycerin and his pain slowly resolved completely. Overall it lasted about 20 minutes. He denies ever having pain like this in the past. Initial troponin was normal at 13 and second troponin was elevated at 98. The second EKG showed new intraventricular conduction delay. These results were discussed with cardiology Dr. Kapil Deng who agreed with transfer to [facility].       Therefore, he is discharged in guarded and stable condition to a short-term general hosptial for inpatient care.        Discharge Date  11/30/2019    FOLLOW UP ITEMS POST DISCHARGE  Cardiology for catheterization    DISCHARGE DIAGNOSES  Active Problems:    Dyslipidemia POA: Unknown    Chest pain POA: Unknown  Resolved Problems:    * No resolved hospital problems. *      FOLLOW UP  No future appointments.  No follow-up provider specified.    MEDICATIONS ON DISCHARGE     Medication List      ASK your doctor about these medications      Instructions   carBAMazepine 200 MG Tabs  Commonly " known as:  TEGRETOL   Take 200 mg by mouth every evening.  Dose:  200 mg     CIALIS 10 MG tablet  Generic drug:  tadalafil   Take 10 mg by mouth as needed for Erectile Dysfunction.  Dose:  10 mg     MULTIVITAMIN PO   Take 1 Tab by mouth every day.  Dose:  1 Tab            Allergies  No Known Allergies    DIET  Orders Placed This Encounter   Procedures   • Diet Order Cardiac     Standing Status:   Standing     Number of Occurrences:   1     Order Specific Question:   Diet:     Answer:   Cardiac [6]     Order Specific Question:   Miscellaneous modifications:     Answer:   No Decaf, No Caffeine(for test) [11]       ACTIVITY  Bed rest  Weight bearing as tolerated    CONSULTATIONS  Cardiology Dr. Kapil Deng    PROCEDURES  none    LABORATORY  Lab Results   Component Value Date    SODIUM 139 11/30/2019    POTASSIUM 4.6 11/30/2019    CHLORIDE 101 11/30/2019    CO2 25 11/30/2019    GLUCOSE 128 (H) 11/30/2019    BUN 24 (H) 11/30/2019    CREATININE 0.92 11/30/2019        Lab Results   Component Value Date    WBC 5.5 11/30/2019    HEMOGLOBIN 16.0 11/30/2019    HEMATOCRIT 46.7 11/30/2019    PLATELETCT 165 11/30/2019        Total time of the discharge process exceeds 55 minutes.

## 2019-12-01 NOTE — PROGRESS NOTES
Pt brought to Tele 8 824 bed 1 by EMS. Admitting called, consent signed, pt hooked up to tele monitor, heparin drip continued and rate verified. Hospitalist and cardiologist called. VSS, pt not complaining of any chest pain at this time. Continuing to monitor patient closely.

## 2019-12-01 NOTE — CONSULTS
Cardiology Consult Note    DOS: 11/30/2019    Consulting physician: Palak Foss    Chief complaint/Reason for consult: NSTEMI    HPI:  Patient is a 53 yo M. History of seizure disorder well controlled on Tegretol. He has no other medical history. He moved here from [facility] about a year ago. He was in his drive way yesterday morning shoveling snow/ice. Started to get chest pain. Substernal and pressure like, radiating down both arms. Lasted about 15-20 minutes and then went away with rest. Never happened before. Went to local ED, initial HS trop was normal and then came up to ~100 or so. EKG non specific. Transferred here for NSTEMI. Not currently having pain.    ROS (+ highlighted in BOLD):  Constitutional: Fevers/chills/fatigue/weightloss  HEENT: Blurry vision/eye pain/sore throat/hearing loss  Respiratory: Shortness of breath/cough  Cardiovascular: Chest pain/palpitations/edema/orthopnea/syncope  GI: Nausea/vomitting/diarrhea  MSK: Arthralgias/myagias/muscle weakness  Skin: Rash/sores  Neurological: Numbness/tremors/vertigo  Endocrine: Excessive thirst/polyuria/cold intolerance/heat intolerance  Psych: Depression/anxiety    Past Medical History:   Diagnosis Date   • Seizure (HCC)      Social History     Socioeconomic History   • Marital status:      Spouse name: Not on file   • Number of children: Not on file   • Years of education: Not on file   • Highest education level: Not on file   Occupational History   • Not on file   Social Needs   • Financial resource strain: Not on file   • Food insecurity:     Worry: Never true     Inability: Never true   • Transportation needs:     Medical: No     Non-medical: No   Tobacco Use   • Smoking status: Never Smoker   • Smokeless tobacco: Never Used   Substance and Sexual Activity   • Alcohol use: Yes     Frequency: Monthly or less     Drinks per session: 1 or 2     Binge frequency: Never   • Drug use: Never   • Sexual activity: Not on file   Lifestyle   •  Physical activity:     Days per week: Not on file     Minutes per session: Not on file   • Stress: Not on file   Relationships   • Social connections:     Talks on phone: Not on file     Gets together: Not on file     Attends Bahai service: Not on file     Active member of club or organization: Not on file     Attends meetings of clubs or organizations: Not on file     Relationship status: Not on file   • Intimate partner violence:     Fear of current or ex partner: Not on file     Emotionally abused: Not on file     Physically abused: Not on file     Forced sexual activity: Not on file   Other Topics Concern   • Not on file   Social History Narrative   • Not on file       Family History   Problem Relation Age of Onset   • Diabetes Father    • Stroke Paternal Grandmother    • Diabetes Paternal Grandfather      No Known Allergies    Current Facility-Administered Medications   Medication Dose Route Frequency Provider Last Rate Last Dose   • enalaprilat (VASOTEC) injection 1.25 mg  1.25 mg Intravenous Q6HRS PRN Palak Foss M.D.       • ondansetron (ZOFRAN ODT) dispertab 4 mg  4 mg Oral Q4HRS PRNEGIN Foss M.D.       • ondansetron (ZOFRAN) syringe/vial injection 4 mg  4 mg Intravenous Q4HRS PRNEGIN Foss M.D.       • prochlorperazine (COMPAZINE) injection 5-10 mg  5-10 mg Intravenous Q4HRS PRNEGIN Foss M.D.       • promethazine (PHENERGAN) suppository 12.5-25 mg  12.5-25 mg Rectal Q4HRS PRNEGIN Foss M.D.       • promethazine (PHENERGAN) tablet 12.5-25 mg  12.5-25 mg Oral Q4HRS PRNEGIN Foss M.D.       • acetaminophen (TYLENOL) tablet 650 mg  650 mg Oral Q6HRS PRNEGIN Foss M.D.       • aspirin (ASA) tablet 325 mg  325 mg Oral DAILY Palak Foss M.D.        Or   • aspirin (ASA) chewable tab 324 mg  324 mg Oral DAILY Palak Foss M.D.        Or   • aspirin (ASA) suppository 300 mg  300 mg Rectal DAILY Palak Foss M.D.       • [START ON 12/1/2019] carBAMazepine  (TEGRETOL) tablet 200 mg  200 mg Oral Q EVENING Palak Foss M.D.       • heparin injection 3,800 Units  3,800 Units Intravenous PRN Palak Foss M.D.        And   • heparin infusion 25,000 units in 500 mL 0.45% NACL   Intravenous Continuous Palak Foss M.D.           Physical Exam:  Vitals:    11/30/19 1936   BP: 137/96   Pulse: 84   Resp: 16   Temp: 36.8 °C (98.2 °F)   TempSrc: Temporal   SpO2: 93%   Weight: 97.1 kg (214 lb 1.1 oz)     General appearance: NAD, conversant   Eyes: anicteric sclerae, moist conjunctivae; no lid-lag; PERRLA  HENT: Atraumatic; oropharynx clear with moist mucous membranes and no mucosal ulcerations; normal hard and soft palate  Neck: Trachea midline; FROM, supple, no thyromegaly or lymphadenopathy  Lungs: CTA, with normal respiratory effort and no intercostal retractions  CV: RRR, no MRGs, no JVD   Abdomen: Soft, non-tender; no masses or HSM  Extremities: No peripheral edema or extremity lymphadenopathy  Skin: Normal temperature, turgor and texture; no rash, ulcers or subcutaneous nodules  Psych: Appropriate affect, alert and oriented to person, place and time    Data:  Labs reviewed    CXR interpreted by me:  WNL    EKG interpreted by me:   NSR, borderline IVCD    Impression/Plan:  1)NSTEMI/ACS    -Given the presentation, and rise in cardiac enzymes, consistent with acute coronary syndrome  -No evidence of STEMI  -Agree with parenteral anticoagulation  -I would add high potency statin  -Check LV function  -Risks/benefits/alternatives to coronary angiography + potential revascularization if appropriate discussed with him  -All questions answered and he is willing to proceed  -Will plan on cath tomorrow, keep NPO AM    Kapil Deng MD

## 2019-12-01 NOTE — DISCHARGE INSTRUCTIONS
Discharge Instructions per Palak Foss M.D.    Follow up with cardiology at [facility]    DIET: npo    ACTIVITY: none    DIAGNOSIS: type 2 MI

## 2019-12-01 NOTE — CARE PLAN
Pt educated on the importance fall prevention methods, such as treaded sock and the bed alarm. Pt stated they will use the call light prior to any attempts of ambulation. Ambulatory ability assessed, treaded socks in place, bed locked and in low position, frequent trips to bathroom offered, and call light and phone within reach. Pt educated regarding plan of care and medications. All questions answered.

## 2019-12-01 NOTE — CARE PLAN
Problem: Safety  Goal: Will remain free from injury  Outcome: PROGRESSING AS EXPECTED  Note:   Pt steady on feet.      Problem: Pain Management  Goal: Pain level will decrease to patient's comfort goal  Outcome: PROGRESSING AS EXPECTED  Note:   Pt denies chest pain at this time

## 2019-12-01 NOTE — DISCHARGE PLANNING
Notified by RN pt needs to be transferred to [facility]. Obtained room assignment T824/1 from transfer center.     REMSA form completed and faxed    Called [facility] and spoke with Clarissa. Clarissa confirmed 6:30 transport time.     COBRA completed and placed in pt's chart

## 2019-12-01 NOTE — H&P
"Hospital Medicine History & Physical Note     Date of Service  11/30/2019     Primary Care Physician  None     Consultants  none     Code Status  full     Chief Complaint  Chest pain     History of Presenting Illness  54 y.o. male who presented 11/30/2019 with chest pain while breaking up ice in front of his house. He was shoveling snow yesterday and states it was much more strenuous and had not chest pain all day yesterday. Today he was breaking up ice on the ground and had acute onset of mid sternal chest pain that was pressure-like in sensation and radiated into both of his arms. In his forearms  He felt tingling and an \"aching\" sensation. He had some flushing of his face. He denies any diaphoresis, shortness of breath or nausea. He did have a mild headache with the incident.  Symptoms lasted 10 minutes and after taking aspirin given to him by paramedics his pain started to improve. He was then given nitroglycerin and his pain slowly resolved completely. Overall it lasted about 20 minutes. He denies ever having pain like this in the past.     Review of Systems  Review of Systems   Constitutional: Negative for chills, diaphoresis, fever and malaise/fatigue.        Flushing with chest pain   HENT: Negative for congestion and sore throat.    Respiratory: Negative for cough, shortness of breath, wheezing and stridor.    Cardiovascular: Positive for chest pain. Negative for palpitations, orthopnea and leg swelling.   Gastrointestinal: Negative for abdominal pain, constipation, heartburn, nausea and vomiting.   Genitourinary: Negative for frequency, hematuria and urgency.   Musculoskeletal: Negative for back pain and myalgias.        Arm aching and tingling with chest pain   Skin: Negative for itching and rash.   Neurological: Positive for tingling and headaches. Negative for dizziness, tremors, focal weakness and seizures.   Psychiatric/Behavioral: The patient is not nervous/anxious and does not have insomnia.    All " other systems reviewed and are negative.        Past Medical History   has a past medical history of Seizure (HCC).seizure due to a cerebral cavernous angioma     Surgical History  No surgery     Family History  Father had heart disease diagnosed in his 50's, paternal grandfather had heart disease     Social History   reports that he has never smoked. He does not have any smokeless tobacco history on file. He reports current alcohol use. He reports that he does not use drugs.He drinks one glass of wine weekly     Allergies  No Known Allergies     Medications  Prior to Admission Medications   Prescriptions Last Dose Informant Patient Reported? Taking?   Multiple Vitamins-Minerals (MULTIVITAMIN PO) 11/26/2019 at am Patient Yes Yes   Sig: Take 1 Tab by mouth every day.   carBAMazepine (TEGRETOL) 200 MG Tab 11/29/2019 at 2000 Patient Yes Yes   Sig: Take 200 mg by mouth every evening.   tadalafil (CIALIS) 10 MG tablet 11/29/2019 at 1900 Patient Yes Yes   Sig: Take 10 mg by mouth as needed for Erectile Dysfunction.      Facility-Administered Medications: None         Physical Exam  Temp:  [36.6 °C (97.9 °F)] 36.6 °C (97.9 °F)  Pulse:  [80-88] 88  Resp:  [12-51] 21  BP: (130-152)/(62-95) 152/84  SpO2:  [92 %-96 %] 96 %     Physical Exam  Vitals signs and nursing note reviewed.   Constitutional:       Appearance: He is not ill-appearing or diaphoretic.   HENT:      Head: Atraumatic.      Comments: Scar over left forehead      Nose: No congestion or rhinorrhea.      Mouth/Throat:      Mouth: Mucous membranes are dry.      Pharynx: No posterior oropharyngeal erythema.   Eyes:      Extraocular Movements: Extraocular movements intact.      Pupils: Pupils are equal, round, and reactive to light.   Neck:      Musculoskeletal: Neck supple. No muscular tenderness.   Cardiovascular:      Rate and Rhythm: Normal rate and regular rhythm.      Pulses: Normal pulses.      Heart sounds: Normal heart sounds. No murmur.   Pulmonary:       Effort: Pulmonary effort is normal. No respiratory distress.      Breath sounds: Normal breath sounds. No wheezing.   Abdominal:      General: There is no distension.      Palpations: Abdomen is soft. There is no mass.      Tenderness: There is no tenderness.   Musculoskeletal:         General: No swelling, tenderness or signs of injury.   Skin:     General: Skin is warm and dry.      Coloration: Skin is not jaundiced.   Neurological:      General: No focal deficit present.      Mental Status: He is alert and oriented to person, place, and time.      Motor: No weakness.      Coordination: Coordination normal.   Psychiatric:         Mood and Affect: Mood normal.         Behavior: Behavior normal.         Thought Content: Thought content normal.            Laboratory:      Recent Labs     11/30/19  1040   WBC 5.5   RBC 5.23   HEMOGLOBIN 16.0   HEMATOCRIT 46.7   MCV 89.3   MCH 30.6   MCHC 34.3   RDW 39.8   PLATELETCT 165   MPV 10.0          Recent Labs     11/30/19  1040   SODIUM 139   POTASSIUM 4.6   CHLORIDE 101   CO2 25   GLUCOSE 128*   BUN 24*   CREATININE 0.92   CALCIUM 9.9          Recent Labs     11/30/19  1040   ALTSGPT 38   ASTSGOT 27   ALKPHOSPHAT 57   TBILIRUBIN 0.3   GLUCOSE 128*          No results for input(s): NTPROBNP in the last 72 hours.          Recent Labs     11/30/19  1040   TROPONINT 13         Urinalysis:    No results found      Imaging:  DX-CHEST-PORTABLE (1 VIEW)   Final Result       Negative single view of the chest.       NM-CARDIAC STRESS TEST    (Results Pending)            Assessment/Plan:  I anticipate this patient will require at least two midnights for appropriate medical management, necessitating inpatient admission.     Chest pain  Assessment & Plan  Concerning for inducible ischemia given the patient's symptoms  He did take cialis last night at 7:30pm and this must be 72 hours later for a chemical stress test, I reviewed this with nuclear medicine, [facility] and will order for a  treadmill test with images which can be done tomorrow as he also drank caffeinated coffee this morning at 7am  I will repeat a troponin as his first one is normal to ensure no active ischemia     Dyslipidemia  Assessment & Plan  The patient states he stopped statin therapy as it did not lower his cholesterol, I will repeat a fasting cholesterol test in the morning to see where his levels are     Addendum: the patient had ekg changes with interventricular conduction delay as reviewed by myself and rising troponin to the 90's consistent with a type 2 MI, I called Dr. Kapil Deng who agrees with heparin drip and transfer to [facility] for cardiac catheterization     VTE prophylaxis: heparin drip

## 2019-12-01 NOTE — PROGRESS NOTES
Assumed care at 0700. Bedside report received from [place]. Patient's chart and MAR reviewed. Pt denies pain at this time. Pt is A & O 4. Patient was updated on plan of care for the day. Questions answered and concerns addressed.  Pt denies any additional needs at this time. White board updated. Call light, phone and personal belongings within reach.

## 2019-12-01 NOTE — PROGRESS NOTES
Received direct admit transfer request from [facility].  Sending Physician: Dr. Foss  Specialist consulted: cardiology  Diagnosis: Chest pain  Patient accepted by: Dr. Foss  Patient coming via:Ground  ETA: TBD  Nursing to notify Direct Admit On-Call Hospitalist and release orders when patient arrives.

## 2019-12-01 NOTE — PROGRESS NOTES
Pt arrived from Cath lab at 1315.    Bilateral pedal pulses present, strong and equal.  Right radial site clean, dry, intact and soft to the touch.

## 2019-12-02 ENCOUNTER — PATIENT OUTREACH (OUTPATIENT)
Dept: HEALTH INFORMATION MANAGEMENT | Facility: OTHER | Age: 54
End: 2019-12-02

## 2019-12-02 VITALS
BODY MASS INDEX: 28.3 KG/M2 | SYSTOLIC BLOOD PRESSURE: 146 MMHG | TEMPERATURE: 97.6 F | WEIGHT: 214.51 LBS | DIASTOLIC BLOOD PRESSURE: 91 MMHG | OXYGEN SATURATION: 94 % | RESPIRATION RATE: 17 BRPM | HEART RATE: 82 BPM

## 2019-12-02 PROBLEM — R07.9 CHEST PAIN: Status: RESOLVED | Noted: 2019-11-30 | Resolved: 2019-12-02

## 2019-12-02 LAB
ALBUMIN SERPL BCP-MCNC: 4 G/DL (ref 3.2–4.9)
ALBUMIN/GLOB SERPL: 1.7 G/DL
ALP SERPL-CCNC: 44 U/L (ref 30–99)
ALT SERPL-CCNC: 33 U/L (ref 2–50)
ANION GAP SERPL CALC-SCNC: 6 MMOL/L (ref 0–11.9)
AST SERPL-CCNC: 23 U/L (ref 12–45)
BASOPHILS # BLD AUTO: 0.3 % (ref 0–1.8)
BASOPHILS # BLD: 0.02 K/UL (ref 0–0.12)
BILIRUB SERPL-MCNC: 0.4 MG/DL (ref 0.1–1.5)
BUN SERPL-MCNC: 17 MG/DL (ref 8–22)
CALCIUM SERPL-MCNC: 9.5 MG/DL (ref 8.5–10.5)
CHLORIDE SERPL-SCNC: 106 MMOL/L (ref 96–112)
CO2 SERPL-SCNC: 25 MMOL/L (ref 20–33)
CREAT SERPL-MCNC: 0.95 MG/DL (ref 0.5–1.4)
EOSINOPHIL # BLD AUTO: 0.1 K/UL (ref 0–0.51)
EOSINOPHIL NFR BLD: 1.7 % (ref 0–6.9)
ERYTHROCYTE [DISTWIDTH] IN BLOOD BY AUTOMATED COUNT: 41 FL (ref 35.9–50)
GLOBULIN SER CALC-MCNC: 2.4 G/DL (ref 1.9–3.5)
GLUCOSE SERPL-MCNC: 92 MG/DL (ref 65–99)
HCT VFR BLD AUTO: 46.4 % (ref 42–52)
HGB BLD-MCNC: 15.8 G/DL (ref 14–18)
IMM GRANULOCYTES # BLD AUTO: 0.02 K/UL (ref 0–0.11)
IMM GRANULOCYTES NFR BLD AUTO: 0.3 % (ref 0–0.9)
LYMPHOCYTES # BLD AUTO: 1.96 K/UL (ref 1–4.8)
LYMPHOCYTES NFR BLD: 33.2 % (ref 22–41)
MAGNESIUM SERPL-MCNC: 1.9 MG/DL (ref 1.5–2.5)
MCH RBC QN AUTO: 31.5 PG (ref 27–33)
MCHC RBC AUTO-ENTMCNC: 34.1 G/DL (ref 33.7–35.3)
MCV RBC AUTO: 92.4 FL (ref 81.4–97.8)
MONOCYTES # BLD AUTO: 0.92 K/UL (ref 0–0.85)
MONOCYTES NFR BLD AUTO: 15.6 % (ref 0–13.4)
NEUTROPHILS # BLD AUTO: 2.89 K/UL (ref 1.82–7.42)
NEUTROPHILS NFR BLD: 48.9 % (ref 44–72)
NRBC # BLD AUTO: 0 K/UL
NRBC BLD-RTO: 0 /100 WBC
PLATELET # BLD AUTO: 148 K/UL (ref 164–446)
PMV BLD AUTO: 9.9 FL (ref 9–12.9)
POTASSIUM SERPL-SCNC: 4.5 MMOL/L (ref 3.6–5.5)
PROT SERPL-MCNC: 6.4 G/DL (ref 6–8.2)
RBC # BLD AUTO: 5.02 M/UL (ref 4.7–6.1)
SODIUM SERPL-SCNC: 137 MMOL/L (ref 135–145)
WBC # BLD AUTO: 5.9 K/UL (ref 4.8–10.8)

## 2019-12-02 PROCEDURE — 85025 COMPLETE CBC W/AUTO DIFF WBC: CPT

## 2019-12-02 PROCEDURE — 99239 HOSP IP/OBS DSCHRG MGMT >30: CPT | Performed by: HOSPITALIST

## 2019-12-02 PROCEDURE — 36415 COLL VENOUS BLD VENIPUNCTURE: CPT

## 2019-12-02 PROCEDURE — 700102 HCHG RX REV CODE 250 W/ 637 OVERRIDE(OP): Performed by: INTERNAL MEDICINE

## 2019-12-02 PROCEDURE — 83735 ASSAY OF MAGNESIUM: CPT

## 2019-12-02 PROCEDURE — A9270 NON-COVERED ITEM OR SERVICE: HCPCS | Performed by: INTERNAL MEDICINE

## 2019-12-02 PROCEDURE — A9270 NON-COVERED ITEM OR SERVICE: HCPCS | Performed by: HOSPITALIST

## 2019-12-02 PROCEDURE — 80053 COMPREHEN METABOLIC PANEL: CPT

## 2019-12-02 PROCEDURE — 700102 HCHG RX REV CODE 250 W/ 637 OVERRIDE(OP): Performed by: HOSPITALIST

## 2019-12-02 RX ORDER — ASPIRIN 81 MG/1
81 TABLET ORAL DAILY
Qty: 100 TAB | Refills: 0 | Status: SHIPPED | OUTPATIENT
Start: 2019-12-03 | End: 2020-11-10

## 2019-12-02 RX ORDER — ATORVASTATIN CALCIUM 40 MG/1
40 TABLET, FILM COATED ORAL DAILY
Status: DISCONTINUED | OUTPATIENT
Start: 2019-12-03 | End: 2019-12-02 | Stop reason: HOSPADM

## 2019-12-02 RX ORDER — ATORVASTATIN CALCIUM 40 MG/1
40 TABLET, FILM COATED ORAL DAILY
Qty: 30 TAB | Refills: 0 | Status: SHIPPED | OUTPATIENT
Start: 2019-12-03 | End: 2019-12-24

## 2019-12-02 RX ADMIN — ASPIRIN 81 MG: 81 TABLET, COATED ORAL at 08:40

## 2019-12-02 RX ADMIN — ATORVASTATIN CALCIUM 80 MG: 80 TABLET, FILM COATED ORAL at 05:00

## 2019-12-02 NOTE — DISCHARGE INSTRUCTIONS
Discharge Instructions    Discharged to home by car with relative. Discharged via walking, hospital escort: Refused.  Special equipment needed: Not Applicable    Be sure to schedule a follow-up appointment with your primary care doctor or any specialists as instructed.     Discharge Plan:   Diet Plan: Discussed  Activity Level: Discussed  Confirmed Follow up Appointment: Appointment Scheduled  Confirmed Symptoms Management: Discussed  Medication Reconciliation Updated: Yes  Influenza Vaccine Indication: Not indicated: Previously immunized this influenza season and > 8 years of age(September 2019)    I understand that a diet low in cholesterol, fat, and sodium is recommended for good health. Unless I have been given specific instructions below for another diet, I accept this instruction as my diet prescription.   Other diet:   Heart-Healthy Eating Plan  Introduction  Heart-healthy meal planning includes:  · Limiting unhealthy fats.  · Increasing healthy fats.  · Making other small dietary changes.  You may need to talk with your doctor or a diet specialist (dietitian) to create an eating plan that is right for you.  What types of fat should I choose?  · Choose healthy fats. These include olive oil and canola oil, flaxseeds, walnuts, almonds, and seeds.  · Eat more omega-3 fats. These include salmon, mackerel, sardines, tuna, flaxseed oil, and ground flaxseeds. Try to eat fish at least twice each week.  · Limit saturated fats.  ¨ Saturated fats are often found in animal products, such as meats, butter, and cream.  ¨ Plant sources of saturated fats include palm oil, palm kernel oil, and coconut oil.  · Avoid foods with partially hydrogenated oils in them. These include stick margarine, some tub margarines, cookies, crackers, and other baked goods. These contain trans fats.  What general guidelines do I need to follow?  · Check food labels carefully. Identify foods with trans fats or high amounts of saturated  "fat.  · Fill one half of your plate with vegetables and green salads. Eat 4-5 servings of vegetables per day. A serving of vegetables is:  ¨ 1 cup of raw leafy vegetables.  ¨ ½ cup of raw or cooked cut-up vegetables.  ¨ ½ cup of vegetable juice.  · Fill one fourth of your plate with whole grains. Look for the word \"whole\" as the first word in the ingredient list.  · Fill one fourth of your plate with lean protein foods.  · Eat 4-5 servings of fruit per day. A serving of fruit is:  ¨ One medium whole fruit.  ¨ ¼ cup of dried fruit.  ¨ ½ cup of fresh, frozen, or canned fruit.  ¨ ½ cup of 100% fruit juice.  · Eat more foods that contain soluble fiber. These include apples, broccoli, carrots, beans, peas, and barley. Try to get 20-30 g of fiber per day.  · Eat more home-cooked food. Eat less restaurant, buffet, and fast food.  · Limit or avoid alcohol.  · Limit foods high in starch and sugar.  · Avoid fried foods.  · Avoid frying your food. Try baking, boiling, grilling, or broiling it instead. You can also reduce fat by:  ¨ Removing the skin from poultry.  ¨ Removing all visible fats from meats.  ¨ Skimming the fat off of stews, soups, and gravies before serving them.  ¨ Steaming vegetables in water or broth.  · Lose weight if you are overweight.  · Eat 4-5 servings of nuts, legumes, and seeds per week:  ¨ One serving of dried beans or legumes equals ½ cup after being cooked.  ¨ One serving of nuts equals 1½ ounces.  ¨ One serving of seeds equals ½ ounce or one tablespoon.  · You may need to keep track of how much salt or sodium you eat. This is especially true if you have high blood pressure. Talk with your doctor or dietitian to get more information.  What foods can I eat?  Grains   Breads, including Spanish, white, he, wheat, raisin, rye, oatmeal, and Italian. Tortillas that are neither fried nor made with lard or trans fat. Low-fat rolls, including hotdog and hamburger buns and English muffins. Biscuits. Muffins. " Waffles. Pancakes. Light popcorn. Whole-grain cereals. Flatbread. [place] toast. Pretzels. Breadsticks. Rusks. Low-fat snacks. Low-fat crackers, including oyster, saltine, matzo, casandra, animal, and rye. Rice and pasta, including brown rice and pastas that are made with whole wheat.  Vegetables   All vegetables.  Fruits   All fruits, but limit coconut.  Meats and Other Protein Sources   Lean, well-trimmed beef, veal, pork, and lamb. Chicken and turkey without skin. All fish and shellfish. Wild duck, rabbit, pheasant, and venison. Egg whites or low-cholesterol egg substitutes. Dried beans, peas, lentils, and tofu. Seeds and most nuts.  Dairy   Low-fat or nonfat cheeses, including ricotta, string, and mozzarella. Skim or 1% milk that is liquid, powdered, or evaporated. Buttermilk that is made with low-fat milk. Nonfat or low-fat yogurt.  Beverages   Mineral water. Diet carbonated beverages.  Sweets and Desserts   Sherbets and fruit ices. Honey, jam, marmalade, jelly, and syrups. Meringues and gelatins. Pure sugar candy, such as hard candy, jelly beans, gumdrops, mints, marshmallows, and small amounts of dark chocolate. [place] food cake.  Eat all sweets and desserts in moderation.  Fats and Oils   Nonhydrogenated (trans-free) margarines. Vegetable oils, including soybean, sesame, sunflower, olive, peanut, safflower, corn, canola, and cottonseed. Salad dressings or mayonnaise made with a vegetable oil. Limit added fats and oils that you use for cooking, baking, salads, and as spreads.  Other   Cocoa powder. Coffee and tea. All seasonings and condiments.  The items listed above may not be a complete list of recommended foods or beverages. Contact your dietitian for more options.   What foods are not recommended?  Grains   Breads that are made with saturated or trans fats, oils, or whole milk. Croissants. Butter rolls. Cheese breads. Sweet rolls. Donuts. Buttered popcorn. Chow mein noodles. High-fat crackers, such as cheese  or butter crackers.  Meats and Other Protein Sources   Fatty meats, such as hotdogs, short ribs, sausage, spareribs, frias, rib eye roast or steak, and mutton. High-fat deli meats, such as salami and bologna. Caviar. Domestic duck and goose. Organ meats, such as kidney, liver, sweetbreads, and heart.  Dairy   Cream, sour cream, cream cheese, and creamed cottage cheese. Whole-milk cheeses, including blue (alejandro), [place] Chun, Brie, Kushal, American, Havarti, Swiss, cheddar, Camembert, and [place]. Whole or 2% milk that is liquid, evaporated, or condensed. Whole buttermilk. Cream sauce or high-fat cheese sauce. Yogurt that is made from whole milk.  Beverages   Regular sodas and juice drinks with added sugar.  Sweets and Desserts   Frosting. Pudding. Cookies. Cakes other than preet food cake. Candy that has milk chocolate or white chocolate, hydrogenated fat, butter, coconut, or unknown ingredients. Buttered syrups. Full-fat ice cream or ice cream drinks.  Fats and Oils   Gravy that has suet, meat fat, or shortening. Cocoa butter, hydrogenated oils, palm oil, coconut oil, palm kernel oil. These can often be found in baked products, candy, fried foods, nondairy creamers, and whipped toppings. Solid fats and shortenings, including frias fat, salt pork, lard, and butter. Nondairy cream substitutes, such as coffee creamers and sour cream substitutes. Salad dressings that are made of unknown oils, cheese, or sour cream.  The items listed above may not be a complete list of foods and beverages to avoid. Contact your dietitian for more information.   This information is not intended to replace advice given to you by your health care provider. Make sure you discuss any questions you have with your health care provider.  Document Released: 06/18/2013 Document Revised: 05/25/2017 Document Reviewed: 06/11/2015  © 2017 Elsevier      Special Instructions:     · Is patient discharged on Warfarin / Coumadin?   No     Chest Pain,  Nonspecific  It is often hard to give a specific diagnosis for the cause of chest pain. There is always a chance that your pain could be related to something serious, like a heart attack or a blood clot in the lungs. You need to follow up with your caregiver for further evaluation. More lab tests or other studies such as X-rays, electrocardiography, stress testing, or cardiac imaging may be needed to find the cause of your pain.  Most of the time, nonspecific chest pain improves within 2 to 3 days with rest and mild pain medicine. For the next few days, avoid physical exertion or activities that bring on pain. Do not smoke. Avoid drinking alcohol. Call your caregiver for routine follow-up as advised.   SEEK IMMEDIATE MEDICAL CARE IF:  · You develop increased chest pain or pain that radiates to the arm, neck, jaw, back, or abdomen.   · You develop shortness of breath, increased coughing, or you start coughing up blood.   · You have severe back or abdominal pain, nausea, or vomiting.   · You develop severe weakness, fainting, fever, or chills.   Document Released: 12/18/2006 Document Revised: 03/11/2013 Document Reviewed: 06/06/2008  [employer] Patient Information ©2013 [employer].      Depression / Suicide Risk    As you are discharged from this [facility] facility, it is important to learn how to keep safe from harming yourself.    Recognize the warning signs:  · Abrupt changes in personality, positive or negative- including increase in energy   · Giving away possessions  · Change in eating patterns- significant weight changes-  positive or negative  · Change in sleeping patterns- unable to sleep or sleeping all the time   · Unwillingness or inability to communicate  · Depression  · Unusual sadness, discouragement and loneliness  · Talk of wanting to die  · Neglect of personal appearance   · Rebelliousness- reckless behavior  · Withdrawal from people/activities they love  · Confusion- inability to  concentrate     If you or a loved one observes any of these behaviors or has concerns about self-harm, here's what you can do:  · Talk about it- your feelings and reasons for harming yourself  · Remove any means that you might use to hurt yourself (examples: pills, rope, extension cords, firearm)  · Get professional help from the community (Mental Health, Substance Abuse, psychological counseling)  · Do not be alone:Call your Safe Contact- someone whom you trust who will be there for you.  · Call your local CRISIS HOTLINE 187-3021 or 427-020-4458  · Call your local Children's Mobile Crisis Response Team [place] (901) 107-1537 or www.[employer]  · Call the toll free National Suicide Prevention Hotlines   · National Suicide Prevention Lifeline 433-880-ZZXF (3169)  · National Hope Line Network 800-SUICIDE (952-6210)      Discharge Instructions per Kurt Emmanuel M.D.  Follow-up with primary care provider within 5-7 days.  Follow-up with cardiology as instructed.   DIET: Healthy DISH diet   DIAGNOSIS: Chest pain   Return to ER if you develop any of the following symptoms fever, chills, weakness, not feeling well, rash, bleeding, blurred vision, palpitations, cough, pain in any part of your body, shortness of breath, nausea, vomiting, diarrhea, difficulty with urination, dizziness, headache, seizures, loss of consciousness or if you develop any new symptoms.

## 2019-12-02 NOTE — ASSESSMENT & PLAN NOTE
Concern for acute coronary syndrome.  Started on aspirin and heparin  Cardiology consulted, plan for cath study.

## 2019-12-02 NOTE — PROCEDURES
DATE OF SERVICE:  12/01/2019    PROCEDURE:  Cardiac catheterization.  A.  Left heart catheterization.  B.  Left ventriculography.  C.  Selective coronary angiography.  D.  Right radial artery approach.  F.  Conscious sedation supervision.    PREPROCEDURE DIAGNOSES:  1.  Non-ST elevation myocardial infarction.  2.  Hypercholesterolemia, untreated.  3.  Seizure disorder.    POSTPROCEDURE DIAGNOSES:  1.  Mid left anterior descending artery, mild myocardial bridge.  Otherwise,   coronary arteries are angiographically normal.  2.  Normal left ventricular ejection fraction 65% with normal wall motion.  3.  Left ventricle/aorta pullback pressure gradient 23 mmHg.  4.  Normal left ventricular end-diastolic pressure.    PHYSICIAN:  Mike Gutierrez MD    REFERRING PHYSICIAN:  Kapil Deng MD    COMPLICATIONS:  None.    MEDICATIONS:  1.  Versed 3 mg IV.  2.  Fentanyl 100 mcg IV.  3.  Lidocaine 2% subcutaneous.  4.  Heparin 3000 units IA.  5.  Nitroglycerin 100 mcg IA.  6.  Verapamil 2.5 mg IA.    INDICATIONS:  The patient is a 54-year-old male with a history of seizure   disorder on anticonvulsant therapy, who was admitted to [facility]   with a non-ST elevation myocardial infarction.  Echocardiogram on 12/01/2019   showed left ventricular ejection fraction 65% with normal wall motion.  Lipid panel   showed total cholesterol 249, HDL 50, , triglycerides 145.    The patient referred for cardiac catheterization for post-MI risk stratification for   future coronary events.    DESCRIPTION OF PROCEDURE:  After informed consent was obtained, the patient   was brought to the cardiac catheterization laboratory where he was prepped,   draped, and anesthetized in usual manner.    Using ultrasound guidance and modified Seldinger technique, 6-Chinese x 10 cm   introducer sheath was inserted in the right radial artery.  Heparin,   verapamil, and nitroglycerin were given via the side port.      Next, a 4-Chinese JL 3.5  left coronary catheter was inserted in the ostium of left coronary   artery and left coronary angiograms were obtained in various projections.      Next, a 4-South Korean JR 4.0 right coronary catheter was inserted in the ostium of   the right coronary artery and right coronary angiograms were obtained in   various projections.      Next, a 4-South Korean pigtail catheter was advanced in the   left ventricle under fluoroscopic guidance.  A single Single-plane MASON left   ventricular angiogram was performed.  Pre and post angiogram LVEDP, LV, and   aortic pressures were obtained.    At the end the procedure, catheters were removed.  Hemostasis was achieved   with a Hemoband.  The patient tolerated the procedure well.    CONSCIOUS SEDATION SUPERVISION:  I supervised and monitored the administration   of intravenous conscious sedation from 12:21 p.m. until 12:45 p.m.    FINDINGS:  HEMODYNAMICS:  LEFT HEART PRESSURES:   1.  LVEDP 12 mmHg.  2.  Left ventricular systolic pressure 139 mmHg.  3.  Central aortic pressure systolic 116, diastolic 73, mean of 92 mmHg.  4.  Pullback gradient 23 mmHg.    LEFT VENTRICULOGRAPHY:  Left ventricular chamber size, wall motion, and   systolic function are normal.  Visually estimated ejection fraction 65-70%.    CORONARY ARTERIOGRAPHY:  1.  Left main artery:  Left main artery is a large caliber vessel,   angiographically normal and bifurcates into the left anterior descending artery and   dominant circumflex artery.  2.  Left anterior descending artery:  Left anterior descending artery is a   moderately large-caliber vessel, gives rise to a normal complement of septal   and diagonal branches and extends around the apex to supply the distal half of   the inferior wall.  The left anterior descending artery demonstrates mild mid   vessel systolic compression consistent with mild myocardial bridging otherwise   the left anterior descending artery and diagonal branches are angiographically normal.  3.   Circumflex artery:  The circumflex artery is moderately large-caliber   vessel, dominant and gives rise to normal complement of marginal branches and the   posterior descending artery.  Angiographically, the circumflex artery is normal.  4.  Right coronary artery:  The right coronary artery gives rise to posterior   descending artery and small posterolateral branch.  The right coronary artery   is angiographically normal.    PLAN:  Medical therapy.       ____________________________________     MD MARCELINO HERRERA / RIYA    DD:  12/01/2019 16:15:45  DT:  12/01/2019 16:40:28    D#:  [id number]  Job#:  [id number]

## 2019-12-02 NOTE — DISCHARGE SUMMARY
"Discharge Summary    CHIEF COMPLAINT ON ADMISSION  Chest pain    Reason for Admission  Chest Pain     Admission Date  11/30/2019    CODE STATUS  Full code    HPI & HOSPITAL COURSE  54 years old male with past medical history of seizure disorder controlled on carbamazepine admitted 11/30 for chest pain and troponin elevation.  There was concern for acute coronary syndrome, cardiology has been consulted, and the patient was taken to the Cath Lab on 12/1, that showed \"The left anterior descending artery demonstrates mild mid vessel systolic compression consistent with mild myocardial bridging otherwise the left anterior descending artery and diagonal branches are angiographically normal\", and medical therapy with aspirin and statin were recommended by cardiology. Therefore, he is discharged in fair and stable condition to home with close outpatient follow-up.    The patient met 2-midnight criteria for an inpatient stay at the time of discharge.    Discharge Date  12/2/2019     FOLLOW UP ITEMS POST DISCHARGE  Follow-up with primary care provider within 5-7 days  Follow-up with cardiology as instructed    DISCHARGE DIAGNOSES  Principal Problem (Resolved):    Chest pain POA: Yes  Active Problems:    Dyslipidemia POA: Yes    History of seizure disorder (HCC) POA: Yes    FOLLOW UP  Follow-up with primary care provider within 5-7 days  Follow-up with cardiology as instructed    MEDICATIONS ON DISCHARGE     Medication List      START taking these medications      Instructions   aspirin 81 MG EC tablet  Start taking on:  December 3, 2019   Take 1 Tab by mouth every day.  Dose:  81 mg     atorvastatin 40 MG Tabs  Start taking on:  December 3, 2019  Commonly known as:  LIPITOR   Take 1 Tab by mouth every day.  Dose:  40 mg        CONTINUE taking these medications      Instructions   carBAMazepine 200 MG Tabs  Commonly known as:  TEGRETOL   Take 200 mg by mouth every evening.  Dose:  200 mg     CIALIS 10 MG tablet  Generic drug:  " "tadalafil   Take 10 mg by mouth as needed for Erectile Dysfunction.  Dose:  10 mg     MULTIVITAMIN PO   Take 1 Tab by mouth every day.  Dose:  1 Tab          CONSULTATIONS  Cardiology    PROCEDURES  Cardiac catheterization, report below  \"DATE OF SERVICE:  12/01/2019     PROCEDURE:  Cardiac catheterization.  A.  Left heart catheterization.  B.  Left ventriculography.  C.  Selective coronary angiography.  D.  Right radial artery approach.  F.  Conscious sedation supervision.     PREPROCEDURE DIAGNOSES:  1.  Non-ST elevation myocardial infarction.  2.  Hypercholesterolemia, untreated.  3.  Seizure disorder.     POSTPROCEDURE DIAGNOSES:  1.  Mid left anterior descending artery, mild myocardial bridge.  Otherwise,   coronary arteries are angiographically normal.  2.  Normal left ventricular ejection fraction 65% with normal wall motion.  3.  Left ventricle/aorta pullback pressure gradient 23 mmHg.  4.  Normal left ventricular end-diastolic pressure.     PHYSICIAN:  Mike Gutierrez MD     REFERRING PHYSICIAN:  Kapil Deng MD     COMPLICATIONS:  None.     MEDICATIONS:  1.  Versed 3 mg IV.  2.  Fentanyl 100 mcg IV.  3.  Lidocaine 2% subcutaneous.  4.  Heparin 3000 units IA.  5.  Nitroglycerin 100 mcg IA.  6.  Verapamil 2.5 mg IA.     INDICATIONS:  The patient is a 54-year-old male with a history of seizure   disorder on anticonvulsant therapy, who was admitted to [facility]   with a non-ST elevation myocardial infarction.  Echocardiogram on 12/01/2019   showed left ventricular ejection fraction 65% with normal wall motion.  Lipid panel   showed total cholesterol 249, HDL 50, , triglycerides 145.    The patient referred for cardiac catheterization for post-MI risk stratification for   future coronary events.     DESCRIPTION OF PROCEDURE:  After informed consent was obtained, the patient   was brought to the cardiac catheterization laboratory where he was prepped,   draped, and anesthetized in usual " manner.     Using ultrasound guidance and modified Seldinger technique, 6-Eritrean x 10 cm   introducer sheath was inserted in the right radial artery.  Heparin,   verapamil, and nitroglycerin were given via the side port.       Next, a 4-Eritrean JL 3.5 left coronary catheter was inserted in the ostium of left coronary   artery and left coronary angiograms were obtained in various projections.       Next, a 4-Eritrean JR 4.0 right coronary catheter was inserted in the ostium of   the right coronary artery and right coronary angiograms were obtained in   various projections.       Next, a 4-Eritrean pigtail catheter was advanced in the   left ventricle under fluoroscopic guidance.  A single Single-plane MASON left   ventricular angiogram was performed.  Pre and post angiogram LVEDP, LV, and   aortic pressures were obtained.     At the end the procedure, catheters were removed.  Hemostasis was achieved   with a Hemoband.  The patient tolerated the procedure well.     CONSCIOUS SEDATION SUPERVISION:  I supervised and monitored the administration   of intravenous conscious sedation from 12:21 p.m. until 12:45 p.m.     FINDINGS:  HEMODYNAMICS:  LEFT HEART PRESSURES:   1.  LVEDP 12 mmHg.  2.  Left ventricular systolic pressure 139 mmHg.  3.  Central aortic pressure systolic 116, diastolic 73, mean of 92 mmHg.  4.  Pullback gradient 23 mmHg.     LEFT VENTRICULOGRAPHY:  Left ventricular chamber size, wall motion, and   systolic function are normal.  Visually estimated ejection fraction 65-70%.     CORONARY ARTERIOGRAPHY:  1.  Left main artery:  Left main artery is a large caliber vessel,   angiographically normal and bifurcates into the left anterior descending artery and   dominant circumflex artery.  2.  Left anterior descending artery:  Left anterior descending artery is a   moderately large-caliber vessel, gives rise to a normal complement of septal   and diagonal branches and extends around the apex to supply the distal half  "of   the inferior wall.  The left anterior descending artery demonstrates mild mid   vessel systolic compression consistent with mild myocardial bridging otherwise   the left anterior descending artery and diagonal branches are angiographically normal.  3.  Circumflex artery:  The circumflex artery is moderately large-caliber   vessel, dominant and gives rise to normal complement of marginal branches and the   posterior descending artery.  Angiographically, the circumflex artery is normal.  4.  Right coronary artery:  The right coronary artery gives rise to posterior   descending artery and small posterolateral branch.  The right coronary artery   is angiographically normal.     ____________________________________     YOLA WIN MD\"    LABORATORY  Lab Results   Component Value Date    SODIUM 137 12/02/2019    POTASSIUM 4.5 12/02/2019    CHLORIDE 106 12/02/2019    CO2 25 12/02/2019    GLUCOSE 92 12/02/2019    BUN 17 12/02/2019    CREATININE 0.95 12/02/2019        Lab Results   Component Value Date    WBC 5.9 12/02/2019    HEMOGLOBIN 15.8 12/02/2019    HEMATOCRIT 46.4 12/02/2019    PLATELETCT 148 (L) 12/02/2019      Total time of the discharge process exceeds 34 minutes.  "

## 2019-12-02 NOTE — PROGRESS NOTES
Pt dc'd at 1045. IV and monitor removed; monitor room notified. Pt left unit via walking with wife. Personal belongings with pt when leaving unit. Pt given discharge instructions prior to leaving unit including prescription and when to visit with physician; verbalizes understanding. Copy of discharge instructions with pt and in the chart.

## 2019-12-10 NOTE — DOCUMENTATION QUERY
"                                                                         [facility]                                                                       Query Response Note      PATIENT:               ROBERT ROBERTS  ACCT #:                  [id number]  MRN:                     [id number]  :                      1965  ADMIT DATE:       2019 7:26 PM  DISCH DATE:        2019 11:18 AM  RESPONDING  PROVIDER #:        [id number]           QUERY TEXT:    Acute coronary syndrome, chest pain, NSTEMI and NSTEMI type II are all documented in the chart.    Based on clinical findings, risk factors and treatment, can the patient's cardiac condition be further clarified as    NOTE:  If an appropriate response is not listed below, please respond with a new note.                                                                                                            The patient's Clinical Indicators include:  History and Physical  \" interventricular conduction delay as reviewed by myself and rising troponin to the 90's consistent with a type 2 MI, I called Dr. Kapil Deng who agrees  with heparin drip and transfer to [facility] for cardiac catheterization \"    Progress Notes, Consult, Cardiac Cath Note  NSTEMI    Discharge Summary  \" There was concern for acute coronary syndrome, cardiology has been consulted, and the patient was taken to the Cath Lab on , that showed \"The left anterior descending artery demonstrates mild mid vessel systolic compression consistent with mild myocardial bridging otherwise the left anterior descending artery and diagonal branches are angiographically normal\", and medical therapy with aspirin and statin were recommended by cardiology. Therefore\"    Discharge Diagnoses  Chest pain  Options provided:   -- Acute coronary syndrome   -- NSTEMI   -- Type 2 MI (due to demand ischemia or secondary to ischemic imbalance)   -- Chest pain   -- Unable to determine      Query created by: Eddie " Shyanne on 12/10/2019 2:13 AM    RESPONSE TEXT:    Chest pain          Electronically signed by:  NADIA PADGETT 12/10/2019 6:10 AM

## 2019-12-13 ENCOUNTER — OFFICE VISIT (OUTPATIENT)
Dept: MEDICAL GROUP | Facility: MEDICAL CENTER | Age: 54
End: 2019-12-13
Payer: OTHER MISCELLANEOUS

## 2019-12-13 VITALS
WEIGHT: 218.8 LBS | TEMPERATURE: 99 F | HEIGHT: 73 IN | SYSTOLIC BLOOD PRESSURE: 110 MMHG | RESPIRATION RATE: 14 BRPM | OXYGEN SATURATION: 94 % | HEART RATE: 85 BPM | BODY MASS INDEX: 29 KG/M2 | DIASTOLIC BLOOD PRESSURE: 76 MMHG

## 2019-12-13 DIAGNOSIS — G40.909 SEIZURE DISORDER (HCC): ICD-10-CM

## 2019-12-13 DIAGNOSIS — E78.5 DYSLIPIDEMIA: ICD-10-CM

## 2019-12-13 DIAGNOSIS — R07.89 OTHER CHEST PAIN: ICD-10-CM

## 2019-12-13 PROCEDURE — 99214 OFFICE O/P EST MOD 30 MIN: CPT | Performed by: FAMILY MEDICINE

## 2019-12-13 ASSESSMENT — PATIENT HEALTH QUESTIONNAIRE - PHQ9: CLINICAL INTERPRETATION OF PHQ2 SCORE: 0

## 2019-12-13 NOTE — ASSESSMENT & PLAN NOTE
This is a chronic problem.  Recent lipid panel demonstrates total cholesterol 249, HDL 50, , triglycerides 145.  His 10-year ASCVD risk is 4.7%.  The patient has a strong family history of hyperlipidemia.  He is currently getting minimal regular exercise and reports a healthy diet.  He is on atorvastatin 40 mg nightly.

## 2019-12-13 NOTE — ASSESSMENT & PLAN NOTE
This is a chronic problem.  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  He has not had a seizure in over 15 years. He is currently on carbamazepine 200 mg daily.

## 2019-12-13 NOTE — PROGRESS NOTES
"Subjective:     CC:  Diagnoses of Other chest pain, History of seizure disorder (HCC), and Dyslipidemia were pertinent to this visit.    HISTORY OF THE PRESENT ILLNESS: Patient is a 54 y.o. male. He is here today to establish care and discuss the following:      Other chest pain  This is a new problem.  Patient was recently admitted to [facility] for chest pain.  He reports an episode of severe chest pain while shoveling his driveway on 11/30/2018.  He was seen in the ED and noted to have elevated troponin.  He was subsequently taken to the Cath Lab the following day.  Coronary angiogram demonstrated patent coronary arteries however the,\" anterior descending artery demonstrated mild mid vessel systolic compression consistent with mild myocardial bridging.\" The patient was discharged to continue medical management with atorvastatin 40 mg nightly and aspirin 81 mg daily.  The patient reports he has experienced an intermittent, aching, substernal chest pain status post hospital discharge.      History of seizure disorder (HCC)  This is a chronic problem.  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  He has not had a seizure in over 15 years. He is currently on carbamazepine 200 mg daily.    Dyslipidemia  This is a chronic problem.  Recent lipid panel demonstrates total cholesterol 249, HDL 50, , triglycerides 145.  His 10-year ASCVD risk is 4.7%.  The patient has a strong family history of hyperlipidemia.  He is currently getting minimal regular exercise and reports a healthy diet.  He is on atorvastatin 40 mg nightly.      Allergies: Patient has no known allergies.    Current Outpatient Medications Ordered in Epic   Medication Sig Dispense Refill   • aspirin EC 81 MG EC tablet Take 1 Tab by mouth every day. 100 Tab 0   • atorvastatin (LIPITOR) 40 MG Tab Take 1 Tab by mouth every day. 30 Tab 0   • carBAMazepine (TEGRETOL) 200 MG Tab Take " "200 mg by mouth every evening.     • tadalafil (CIALIS) 10 MG tablet Take 10 mg by mouth as needed for Erectile Dysfunction.     • Multiple Vitamins-Minerals (MULTIVITAMIN PO) Take 1 Tab by mouth every day.       No current [facility]-ordered facility-administered medications on file.        Past Medical History:   Diagnosis Date   • Hyperlipidemia    • Seizure (HCC)        History reviewed. No pertinent surgical history.    Social History     Tobacco Use   • Smoking status: Never Smoker   • Smokeless tobacco: Never Used   Substance Use Topics   • Alcohol use: Not Currently     Frequency: Monthly or less     Drinks per session: 1 or 2     Binge frequency: Never     Comment: Rarely   • Drug use: Never       Social History     Patient does not qualify to have social determinant information on file (likely too young).   Social History Narrative   • Not on file       Family History   Problem Relation Age of Onset   • Diabetes Father    • Stroke Paternal Grandmother    • Diabetes Paternal Grandfather    • Stroke Maternal Grandfather        ROS:   Gen: no fevers/chills, no changes in weight  Eyes: no changes in vision  ENT: no sore throat or nasal congestion  Pulm: no sob, no cough  CV: see HPI  GI: no nausea/vomiting, no diarrhea or constipation  : no dysuria  MSk: no myalgias  Skin: no rash  Neuro: no headaches, no numbness/tingling  Heme/Lymph: no easy bruising      Objective:     Exam: /76 (BP Location: Left arm, Patient Position: Sitting, BP Cuff Size: Adult long)   Pulse 85   Temp 37.2 °C (99 °F) (Temporal)   Resp 14   Ht 1.854 m (6' 1\")   Wt 99.2 kg (218 lb 12.8 oz)   SpO2 94%  Body mass index is 28.87 kg/m².    General: Well-developed, well-nourished. Appears stated age.  HEENT: Normocephalic. Conjunctiva clear without injection or scleral icterus. Pupils equal and reactive to light. Ears normal shape and contour, canals are clear bilaterally, tympanic membranes pearly, oropharynx is clear without " "erythema, edema or exudates.   Neck: Supple; no obvious thyromegaly or nodules appreciated  Pulmonary: Clear to ausculation bilaterally.  No increased work of breathing. No rales, ronchi, or wheezing.  Cardiovascular: Regular rate and rhythm without murmur.  Abdomen: Soft, nontender, nondistended. Normal bowel sounds. No guarding or rebound tenderness.  Neurologic: Grossly nonfocal.  Lymph: No cervical or supraclavicular lymphadenopathy  Musculoskeletal: Normal gait.   Psych: Euthymic affect. Alert and oriented x 3. Judgment and insight is normal.    Assessment & Plan:   54 y.o. male with the following -    1. Other chest pain  Patient was recently admitted to [facility] 11/30/2019- 12/2/2019 for acute chest pain with elevated troponin.  Echocardiogram on 12/1/2018 showed left ventricular ejection fraction 65% with normal wall motion.  Coronary angiogram demonstrated patent vessels however the \"left anterior descending artery demonstrating mild mid vessel systolic compression consistent with mild myocardial bridging.\"  Patient was discharged to continue medical management and reports compliance with atorvastatin 40 mg nightly and 81mg aspirin daily.  Patient understands this is an anatomic anomaly however continues to experience intermittent chest pain and would like to speak with a cardiologist regarding his questions.  - REFERRAL TO CARDIOLOGY  -Continue atorvastatin and daily aspirin    2. History of seizure disorder (HCC)  This is a chronic problem.  The patient reports he has been stable on carbamazepine 200 mg daily and has not had a seizure in over 15 years.  He reports his seizures are secondary to a vascular malformation identified over 20 years ago.  - Continue carbamazepine 200 mg daily    3. Dyslipidemia  This is a chronic problem. Recent lipid panel demonstrates total cholesterol 249, HDL 50, , triglycerides 145.  His 10-year ASCVD risk is 4.7%.  As the patient has a strong family history of " hyperlipidemia and elevated LDL recommend continuing statin therapy despite 10-year ASCVD risk of less than 7.5%.  -Continue atorvastatin 40 mg nightly      There are no diagnoses linked to this encounter.    Return in about 3 months (around 3/13/2020).    Please note this dictation was created using voice recognition software. I have made every reasonable attempt to correct obvious errors, but I expect there may be errors of grammar, and possibly content, that I did not discover before finalizing the note.

## 2019-12-13 NOTE — ASSESSMENT & PLAN NOTE
"This is a new problem.  Patient was recently admitted to [facility] for chest pain.  He reports an episode of severe chest pain while shoveling his driveway on 11/30/2018.  He was seen in the ED and noted to have elevated troponin.  He was subsequently taken to the Cath Lab the following day.  Coronary angiogram demonstrated patent coronary arteries however the,\" anterior descending artery demonstrated mild mid vessel systolic compression consistent with mild myocardial bridging.\"  The patient was discharged to continue medical management with atorvastatin 40 mg nightly and aspirin 81 mg daily.  The patient reports he has experienced an intermittent, aching, substernal chest pain status post hospital discharge.    "

## 2019-12-24 ENCOUNTER — OFFICE VISIT (OUTPATIENT)
Dept: CARDIOLOGY | Facility: MEDICAL CENTER | Age: 54
End: 2019-12-24
Payer: OTHER MISCELLANEOUS

## 2019-12-24 VITALS
HEIGHT: 73 IN | DIASTOLIC BLOOD PRESSURE: 82 MMHG | BODY MASS INDEX: 28.49 KG/M2 | OXYGEN SATURATION: 94 % | WEIGHT: 215 LBS | HEART RATE: 84 BPM | SYSTOLIC BLOOD PRESSURE: 116 MMHG

## 2019-12-24 DIAGNOSIS — I21.4 NSTEMI (NON-ST ELEVATED MYOCARDIAL INFARCTION) (HCC): ICD-10-CM

## 2019-12-24 DIAGNOSIS — Z91.89 10 YEAR RISK OF MI OR STROKE < 7.5%: ICD-10-CM

## 2019-12-24 DIAGNOSIS — R79.89 TROPONIN LEVEL ELEVATED: ICD-10-CM

## 2019-12-24 DIAGNOSIS — R07.89 OTHER CHEST PAIN: ICD-10-CM

## 2019-12-24 LAB — EKG IMPRESSION: NORMAL

## 2019-12-24 PROCEDURE — 93000 ELECTROCARDIOGRAM COMPLETE: CPT | Performed by: INTERNAL MEDICINE

## 2019-12-24 PROCEDURE — 99204 OFFICE O/P NEW MOD 45 MIN: CPT | Performed by: INTERNAL MEDICINE

## 2019-12-24 RX ORDER — METOPROLOL SUCCINATE 25 MG/1
25 TABLET, EXTENDED RELEASE ORAL DAILY
Qty: 90 TAB | Refills: 3 | Status: SHIPPED | OUTPATIENT
Start: 2019-12-24 | End: 2020-11-10

## 2019-12-24 ASSESSMENT — ENCOUNTER SYMPTOMS
HEARTBURN: 0
PND: 0
NEAR-SYNCOPE: 0
ORTHOPNEA: 0
WEIGHT LOSS: 0
BACK PAIN: 0
FEVER: 0
PALPITATIONS: 0
IRREGULAR HEARTBEAT: 0
DYSPNEA ON EXERTION: 0
DEPRESSION: 0
DIARRHEA: 0
DIZZINESS: 0
VOMITING: 0
CLAUDICATION: 0
WEIGHT GAIN: 0
ALTERED MENTAL STATUS: 0
BLURRED VISION: 0
SYNCOPE: 0
CONSTIPATION: 0
SHORTNESS OF BREATH: 0
NAUSEA: 0
FLANK PAIN: 0
DECREASED APPETITE: 0
ABDOMINAL PAIN: 0
COUGH: 0

## 2019-12-24 NOTE — PROGRESS NOTES
Cardiology Note    Chief Complaint   Patient presents with   • Chest Pain       History of Present Illness: Jaja Lugo is a 54 y.o. male PMH seizures who presents for follow up after hospital visit.    Patient recent admission to hospital where found nstemi. 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     Since, he is walking and exerting without repeat symptoms. No further angina.      Review of Systems   Constitution: Negative for decreased appetite, fever, malaise/fatigue, weight gain and weight loss.   HENT: Negative for congestion and nosebleeds.    Eyes: Negative for blurred vision.   Cardiovascular: Negative for chest pain, claudication, dyspnea on exertion, irregular heartbeat, leg swelling, near-syncope, orthopnea, palpitations, paroxysmal nocturnal dyspnea and syncope.   Respiratory: Negative for cough and shortness of breath.    Endocrine: Negative for cold intolerance and heat intolerance.   Skin: Negative for rash.   Musculoskeletal: Negative for back pain.   Gastrointestinal: Negative for abdominal pain, constipation, diarrhea, heartburn, melena, nausea and vomiting.   Genitourinary: Negative for dysuria, flank pain and hematuria.   Neurological: Negative for dizziness.   Psychiatric/Behavioral: Negative for altered mental status and depression.         Past Medical History:   Diagnosis Date   • Hyperlipidemia    • Seizure (HCC)          History reviewed. No pertinent surgical history.      Current Outpatient Medications   Medication Sig Dispense Refill   • metoprolol SR (TOPROL XL) 25 MG TABLET SR 24 HR Take 1 Tab by mouth every day. 90 Tab 3   • aspirin EC 81 MG EC tablet Take 1 Tab by mouth every day. 100 Tab 0   • carBAMazepine (TEGRETOL) 200 MG Tab Take 200 mg by mouth every evening.     • tadalafil (CIALIS) 10 MG tablet Take 10 mg by mouth as needed for Erectile Dysfunction.     • Multiple Vitamins-Minerals  "(MULTIVITAMIN PO) Take 1 Tab by mouth every day.       No current facility-administered medications for this visit.          No Known Allergies      Family History   Problem Relation Age of Onset   • Diabetes Father    • Stroke Paternal Grandmother    • Diabetes Paternal Grandfather    • Stroke Maternal Grandfather          Social History     Socioeconomic History   • Marital status:      Spouse name: Not on file   • Number of children: Not on file   • Years of education: Not on file   • Highest education level: Not on file   Occupational History   • Not on file   Social Needs   • Financial resource strain: Not on file   • Food insecurity:     Worry: Never true     Inability: Never true   • Transportation needs:     Medical: No     Non-medical: No   Tobacco Use   • Smoking status: Never Smoker   • Smokeless tobacco: Never Used   Substance and Sexual Activity   • Alcohol use: Not Currently     Frequency: Monthly or less     Drinks per session: 1 or 2     Binge frequency: Never     Comment: Rarely   • Drug use: Never   • Sexual activity: Yes     Partners: Female   Lifestyle   • Physical activity:     Days per week: Not on file     Minutes per session: Not on file   • Stress: Not on file   Relationships   • Social connections:     Talks on phone: Not on file     Gets together: Not on file     Attends Holiness service: Not on file     Active member of club or organization: Not on file     Attends meetings of clubs or organizations: Not on file     Relationship status: Not on file   • Intimate partner violence:     Fear of current or ex partner: Not on file     Emotionally abused: Not on file     Physically abused: Not on file     Forced sexual activity: Not on file   Other Topics Concern   • Not on file   Social History Narrative   • Not on file         Physical Exam:  Ambulatory Vitals  /82 (BP Location: Left arm, Patient Position: Sitting, BP Cuff Size: Adult)   Pulse 84   Ht 1.854 m (6' 1\")   Wt 97.5 " "kg (215 lb)   SpO2 94%    BP Readings from Last 4 Encounters:   12/24/19 116/82   12/13/19 110/76   12/02/19 146/91   11/30/19 135/91     Weight/BMI:   Vitals:    12/24/19 0737   BP: 116/82   Weight: 97.5 kg (215 lb)   Height: 1.854 m (6' 1\")    Body mass index is 28.37 kg/m².  Wt Readings from Last 4 Encounters:   12/24/19 97.5 kg (215 lb)   12/13/19 99.2 kg (218 lb 12.8 oz)   12/01/19 97.3 kg (214 lb 8.1 oz)   11/30/19 100.7 kg (222 lb 0.1 oz)       Physical Exam   Constitutional: He is oriented to person, place, and time and well-developed, well-nourished, and in no distress. No distress.   HENT:   Head: Normocephalic and atraumatic.   Eyes: Pupils are equal, round, and reactive to light. Conjunctivae are normal.   Neck: Normal range of motion. Neck supple. No JVD present.   Cardiovascular: Normal rate, regular rhythm, normal heart sounds and intact distal pulses. Exam reveals no gallop and no friction rub.   No murmur heard.  Pulmonary/Chest: Effort normal and breath sounds normal. No respiratory distress. He has no wheezes. He has no rales. He exhibits no tenderness.   Abdominal: Soft. Bowel sounds are normal. He exhibits no distension.   Musculoskeletal:         General: No edema.   Neurological: He is alert and oriented to person, place, and time.   Skin: Skin is warm and dry.   Psychiatric: Affect and judgment normal.       Lab Data Review:  Lab Results   Component Value Date/Time    CHOLSTRLTOT 249 (H) 12/01/2019 12:13 AM     (H) 12/01/2019 12:13 AM    HDL 50 12/01/2019 12:13 AM    TRIGLYCERIDE 145 12/01/2019 12:13 AM       Lab Results   Component Value Date/Time    SODIUM 137 12/02/2019 02:10 AM    POTASSIUM 4.5 12/02/2019 02:10 AM    CHLORIDE 106 12/02/2019 02:10 AM    CO2 25 12/02/2019 02:10 AM    GLUCOSE 92 12/02/2019 02:10 AM    BUN 17 12/02/2019 02:10 AM    CREATININE 0.95 12/02/2019 02:10 AM     CrCl cannot be calculated (Patient's most recent lab result is older than the maximum 7 days " allowed.).  Lab Results   Component Value Date/Time    ALKPHOSPHAT 44 12/02/2019 02:10 AM    ASTSGOT 23 12/02/2019 02:10 AM    ALTSGPT 33 12/02/2019 02:10 AM    TBILIRUBIN 0.4 12/02/2019 02:10 AM      Lab Results   Component Value Date/Time    WBC 5.9 12/02/2019 02:10 AM     No results found for: HBA1C  No components found for: TROP      Cardiac Imaging and Procedures Review:      TTE 12/1/19  CONCLUSIONS  No prior study is available for comparison.   Left ventricular ejection fraction is visually estimated to be 65%.  Normal inferior vena cava size and inspiratory collapse.  No evidence of valvular abnormality based on blind Doppler evaluation.     [facility] 12/1/19  FINDINGS:  HEMODYNAMICS:  LEFT HEART PRESSURES:   1.  LVEDP 12 mmHg.  2.  Left ventricular systolic pressure 139 mmHg.  3.  Central aortic pressure systolic 116, diastolic 73, mean of 92 mmHg.  4.  Pullback gradient 23 mmHg.     LEFT VENTRICULOGRAPHY:  Left ventricular chamber size, wall motion, and   systolic function are normal.  Visually estimated ejection fraction 65-70%.     CORONARY ARTERIOGRAPHY:  1.  Left main artery:  Left main artery is a large caliber vessel,   angiographically normal and bifurcates into the left anterior descending artery and   dominant circumflex artery.  2.  Left anterior descending artery:  Left anterior descending artery is a   moderately large-caliber vessel, gives rise to a normal complement of septal   and diagonal branches and extends around the apex to supply the distal half of   the inferior wall.  The left anterior descending artery demonstrates mild mid   vessel systolic compression consistent with mild myocardial bridging otherwise   the left anterior descending artery and diagonal branches are angiographically normal.  3.  Circumflex artery:  The circumflex artery is moderately large-caliber   vessel, dominant and gives rise to normal complement of marginal branches and the   posterior descending artery.   Angiographically, the circumflex artery is normal.  4.  Right coronary artery:  The right coronary artery gives rise to posterior   descending artery and small posterolateral branch.  The right coronary artery   is angiographically normal.    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 No signs of coronary plaque. Normal cardiac function on TTE. Start metoprolol for myocardial bridge. Can cont aspirin. Will hold statin for now and follow annual lipids and 10 year risk. For now, C showing no plaque making him very low risk of MI.         Relevant Medications    metoprolol SR (TOPROL XL) 25 MG TABLET SR 24 HR    10 year risk of MI or stroke < 7.5%     Cont heart healthy lifestyle. Discussed exercise 5x weekly and 30min. Diet recommended mediterranean.                It was my pleasure to meet with Mr. Lugo.

## 2019-12-24 NOTE — PATIENT INSTRUCTIONS
Mediterranean Diet  A Mediterranean diet refers to food and lifestyle choices that are based on the traditions of countries located on the [place]. This way of eating has been shown to help prevent certain conditions and improve outcomes for people who have chronic diseases, like kidney disease and heart disease.  What are tips for following this plan?  Lifestyle  · Cook and eat meals together with your family, when possible.  · Drink enough fluid to keep your urine clear or pale yellow.  · Be physically active every day. This includes:  ¨ Aerobic exercise like running or swimming.  ¨ Leisure activities like gardening, walking, or housework.  · Get 7-8 hours of sleep each night.  · If recommended by your health care provider, drink red wine in moderation. This means 1 glass a day for nonpregnant women and 2 glasses a day for men. A glass of wine equals 5 oz (150 mL).  Reading food labels  · Check the serving size of packaged foods. For foods such as rice and pasta, the serving size refers to the amount of cooked product, not dry.  · Check the total fat in packaged foods. Avoid foods that have saturated fat or trans fats.  · Check the ingredients list for added sugars, such as corn syrup.  Shopping  · At the grocery store, buy most of your food from the areas near the walls of the store. This includes:  ¨ Fresh fruits and vegetables (produce).  ¨ Grains, beans, nuts, and seeds. Some of these may be available in unpackaged forms or large amounts (in bulk).  ¨ Fresh seafood.  ¨ Poultry and eggs.  ¨ Low-fat dairy products.  · Buy whole ingredients instead of prepackaged foods.  · Buy fresh fruits and vegetables in-season from local farmers markets.  · Buy frozen fruits and vegetables in resealable bags.  · If you do not have access to quality fresh seafood, buy precooked frozen shrimp or canned fish, such as tuna, salmon, or sardines.  · Buy small amounts of raw or cooked vegetables, salads, or olives from the  deli or salad bar at your store.  · Stock your pantry so you always have certain foods on hand, such as olive oil, canned tuna, canned tomatoes, rice, pasta, and beans.  Cooking  · Cook foods with extra-virgin olive oil instead of using butter or other vegetable oils.  · Have meat as a side dish, and have vegetables or grains as your main dish. This means having meat in small portions or adding small amounts of meat to foods like pasta or stew.  · Use beans or vegetables instead of meat in common dishes like chili or lasagna.  · Mingus with different cooking methods. Try roasting or broiling vegetables instead of steaming or sautéeing them.  · Add frozen vegetables to soups, stews, pasta, or rice.  · Add nuts or seeds for added healthy fat at each meal. You can add these to yogurt, salads, or vegetable dishes.  · Marinate fish or vegetables using olive oil, lemon juice, garlic, and fresh herbs.  Meal planning  · Plan to eat 1 vegetarian meal one day each week. Try to work up to 2 vegetarian meals, if possible.  · Eat seafood 2 or more times a week.  · Have healthy snacks readily available, such as:  ¨ Vegetable sticks with hummus.  ¨ Greek yogurt.  ¨ Fruit and nut trail mix.  · Eat balanced meals throughout the week. This includes:  ¨ Fruit: 2-3 servings a day  ¨ Vegetables: 4-5 servings a day  ¨ Low-fat dairy: 2 servings a day  ¨ Fish, poultry, or lean meat: 1 serving a day  ¨ Beans and legumes: 2 or more servings a week  ¨ Nuts and seeds: 1-2 servings a day  ¨ Whole grains: 6-8 servings a day  ¨ Extra-virgin olive oil: 3-4 servings a day  · Limit red meat and sweets to only a few servings a month  What are my food choices?  · Mediterranean diet  ¨ Recommended  ¨ Grains: Whole-grain pasta. Brown rice. Bulgar wheat. Polenta. Couscous. Whole-wheat bread. Oatmeal. Quinoa.  ¨ Vegetables: Artichokes. Beets. Broccoli. Cabbage. Carrots. Eggplant. Green beans. Chard. Kale. Spinach. Onions. Leeks. Peas. Squash.  Tomatoes. Peppers. Radishes.  ¨ Fruits: Apples. Apricots. Avocado. Berries. Bananas. Cherries. Dates. Figs. Grapes. Bryce. Melon. Oranges. Peaches. Plums. Pomegranate.  ¨ Meats and other protein foods: Beans. Almonds. Sunflower seeds. Pine nuts. Peanuts. Cod. Media. Scallops. Shrimp. Tuna. Tilapia. Clams. Oysters. Eggs.  ¨ Dairy: Low-fat milk. Cheese. Greek yogurt.  ¨ Beverages: Water. Red wine. Herbal tea.  ¨ Fats and oils: Extra virgin olive oil. Avocado oil. Grape seed oil.  ¨ Sweets and desserts: Greek yogurt with honey. Baked apples. Poached pears. Trail mix.  ¨ Seasoning and other foods: Basil. Cilantro. Coriander. Cumin. Mint. Parsley. Av. Rosemary. Tarragon. Garlic. Oregano. Thyme. Pepper. Balsalmic vinegar. Tahini. Hummus. Tomato sauce. Olives. Mushrooms.  ¨ Limit these  ¨ Grains: Prepackaged pasta or rice dishes. Prepackaged cereal with added sugar.  ¨ Vegetables: Deep fried potatoes (french fries).  ¨ Fruits: Fruit canned in syrup.  ¨ Meats and other protein foods: Beef. Pork. Lamb. Poultry with skin. Hot dogs. Remy.  ¨ Dairy: Ice cream. Sour cream. Whole milk.  ¨ Beverages: Juice. Sugar-sweetened soft drinks. Beer. Liquor and spirits.  ¨ Fats and oils: Butter. Canola oil. Vegetable oil. Beef fat (tallow). Lard.  ¨ Sweets and desserts: Cookies. Cakes. Pies. Candy.  ¨ Seasoning and other foods: Mayonnaise. Premade sauces and marinades.  ¨ The items listed may not be a complete list. Talk with your dietitian about what dietary choices are right for you.  Summary  · The Mediterranean diet includes both food and lifestyle choices.  · Eat a variety of fresh fruits and vegetables, beans, nuts, seeds, and whole grains.  · Limit the amount of red meat and sweets that you eat.  · Talk with your health care provider about whether it is safe for you to drink red wine in moderation. This means 1 glass a day for nonpregnant women and 2 glasses a day for men. A glass of wine equals 5 oz (150 mL).  This information  is not intended to replace advice given to you by your health care provider. Make sure you discuss any questions you have with your health care provider.  Document Released: 08/10/2017 Document Revised: 09/12/2017 Document Reviewed: 08/10/2017  Elsevier Interactive Patient Education © 2017 [employer]

## 2019-12-24 NOTE — ASSESSMENT & PLAN NOTE
Cont heart healthy lifestyle. Discussed exercise 5x weekly and 30min. Diet recommended mediterranean.

## 2019-12-24 NOTE — ASSESSMENT & PLAN NOTE
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 No signs of coronary plaque. Normal cardiac function on TTE. Start metoprolol for myocardial bridge. Can cont aspirin. Will hold statin for now and follow annual lipids and 10 year risk. For now, [facility] showing no plaque making him very low risk of MI.

## 2020-02-29 ENCOUNTER — PATIENT MESSAGE (OUTPATIENT)
Dept: MEDICAL GROUP | Facility: MEDICAL CENTER | Age: 55
End: 2020-02-29

## 2020-03-02 ENCOUNTER — PATIENT MESSAGE (OUTPATIENT)
Dept: MEDICAL GROUP | Facility: MEDICAL CENTER | Age: 55
End: 2020-03-02

## 2020-03-02 DIAGNOSIS — Z91.89 AT RISK FOR OBSTRUCTIVE SLEEP APNEA: ICD-10-CM

## 2020-03-02 NOTE — TELEPHONE ENCOUNTER
From: Jaja Lugo  To: Barbara Sibley M.D.  Sent: 3/2/2020 10:07 AM PST  Subject: Non-Urgent Medical Question    Wife witnessed loud snoring, gasping for air      ----- Message -----   From:Barbara Sibley M.D.   Sent:3/2/2020 9:55 AM PST   To:Jaja Lugo   Subject:RE: Non-Urgent Medical Question    Good morning,    I sent a refill to the pharmacy for your carbamazepine on Friday so it should be ready for . Please let me know if you are unable to fill the prescription. Regarding the sleep study, I need a bit more information for the referral. Are you having daytime sleepiness or awaking gasping for air? Or has your wife witnessed you stop breathing or gasping during the night?     Thank you,    Barbara Perez MD      ----- Message -----   From:Jaja Lugo   Sent:2/29/2020 7:39 PM PST   To:Barbara Sibley M.D.   Subject:Non-Urgent Medical Question    Prior to moving to [place], my primary doctor suggested a sleep study to check for sleep apnea. Over the past year my snoring has increased from occasional to nightly. Can you suggest a course of action?    Additionally I emailed you on Thursday to get refill on my carbamazepine which I almost running desperately low...only 3 pills left

## 2020-06-15 RX ORDER — CARBAMAZEPINE 200 MG/1
TABLET ORAL
Qty: 90 TAB | Refills: 1 | Status: SHIPPED | OUTPATIENT
Start: 2020-06-15 | End: 2020-12-18 | Stop reason: SDUPTHER

## 2020-11-10 ENCOUNTER — TELEMEDICINE (OUTPATIENT)
Dept: MEDICAL GROUP | Facility: LAB | Age: 55
End: 2020-11-10
Payer: COMMERCIAL

## 2020-11-10 VITALS — WEIGHT: 215 LBS | HEART RATE: 100 BPM | HEIGHT: 73 IN | BODY MASS INDEX: 28.49 KG/M2

## 2020-11-10 DIAGNOSIS — Z00.00 WELL ADULT EXAM: ICD-10-CM

## 2020-11-10 DIAGNOSIS — L21.0 DANDRUFF IN ADULT: ICD-10-CM

## 2020-11-10 DIAGNOSIS — M67.40 GANGLION CYST: ICD-10-CM

## 2020-11-10 DIAGNOSIS — G40.909 SEIZURE DISORDER (HCC): ICD-10-CM

## 2020-11-10 PROCEDURE — 99214 OFFICE O/P EST MOD 30 MIN: CPT | Performed by: FAMILY MEDICINE

## 2020-11-10 RX ORDER — LEVOCETIRIZINE DIHYDROCHLORIDE 5 MG/1
5 TABLET, FILM COATED ORAL DAILY
COMMUNITY
End: 2023-12-21

## 2020-11-10 ASSESSMENT — FIBROSIS 4 INDEX: FIB4 SCORE: 1.46

## 2020-11-10 NOTE — PROGRESS NOTES
Virtual Visit: New Patient   This visit was conducted via Zoom using secure and encrypted videoconferencing technology. The patient was in a private location in the state of [place].    The patient's identity was confirmed and verbal consent was obtained for this virtual visit.    Subjective:     CC:   Chief Complaint   Patient presents with   • Bump     on thumb       Jaja Lugo is a 55 y.o. male presenting to establish care and to discuss the evaluation and management of:    Bump on thumb  Noticed 7 months ago. Growing over a few weeks, plantar side of MCP, reports it is firm but does have some give. Mildly painful. No history of injury, no history of gout    Dandruff  Patch Back of head, flaky.  Tried any specific shampoos for this.    History of seizures  Secondary to vascular malformation.  He has continued on carbamazepine for these.  No seizures in 15+ years.    ROS  Constitutional: Negative for fever, chills and malaise/fatigue.   HENT: Negative for congestion.    Eyes: Negative for pain.   Respiratory: Negative for cough and shortness of breath.    Cardiovascular: Negative for leg swelling.   Gastrointestinal: Negative for nausea, vomiting, abdominal pain and diarrhea.   Genitourinary: Negative for dysuria and hematuria.   Skin: Negative for rash.   Neurological: Negative for dizziness, focal weakness and headaches.   Endo/Heme/Allergies: Does not bruise/bleed easily.   Psychiatric/Behavioral: Negative for depression.  The patient is not nervous/anxious.      No Known Allergies    Current medicines (including changes today)  Current Outpatient Medications   Medication Sig Dispense Refill   • Levocetirizine Dihydrochloride (XYZAL) 5 MG Tab Take 5 mg by mouth every day.     • carBAMazepine (TEGRETOL) 200 MG Tab TAKE 1 TABLET BY MOUTH EVERY DAY IN THE EVENING 90 Tab 1   • tadalafil (CIALIS) 10 MG tablet Take 10 mg by mouth as needed for Erectile Dysfunction.       No current facility-administered  "medications for this visit.        He  has a past medical history of Hyperlipidemia and Seizure (HCC).  He  has no past surgical history on file.      Family History   Problem Relation Age of Onset   • Diabetes Father    • Stroke Paternal Grandmother    • Diabetes Paternal Grandfather    • Stroke Maternal Grandfather      Family Status   Relation Name Status   • Fa Chun Lugo (Not Specified)   • PGMo  (Not Specified)   • PGFa Glascolisa Lugo (Not Specified)   • MGFa Trev Needle (Not Specified)       Patient Active Problem List    Diagnosis Date Noted   • NSTEMI (non-ST elevated myocardial infarction) ([facility]) 11/30/2019     Priority: High   • History of seizure disorder ([facility]) 12/01/2019     Priority: Medium   • Dyslipidemia 11/30/2019     Priority: Medium   • 10 year risk of MI or stroke < 7.5% 12/24/2019          Objective:   Pulse 100 Comment: Verbal  Ht 1.854 m (6' 1\") Comment: Verbal  Wt 97.5 kg (215 lb) Comment: Verbal  BMI 28.37 kg/m²     Physical Exam:  Constitutional: Alert, no distress, well-groomed.  Skin: No rashes in visible areas.  Eye: Round. Conjunctiva clear, lids normal. No icterus.   ENMT: Lips pink without lesions, good dentition, moist mucous membranes. Phonation normal.  Neck: No masses, no thyromegaly. Moves freely without pain.  Respiratory: Unlabored respiratory effort, no cough or audible wheeze  MSK: The left thumb on the radial side of the MCP there appears to be a ! 0.5 cm subcutaneous nodule  Psych: Alert and oriented x3, normal affect and mood.       Assessment and Plan:   The following treatment plan was discussed:     1. Ganglion cyst  Descriptions and limited exam of subcutaneous nodule to thumb appears consistent with ganglion cyst.  Discussed treatment options including aspiration if you like to pursue this in the future.    2. Dandruff in adult  Recommend they try over-the-counter shampoo and if this does not help with symptoms we could try prescription shampoo.    3. History " of seizure disorder (HCC)  Stable on carbamazepine, seizures for 15+ years.  Secondary to a vascular malformation.  Continue carbamazepine.    4. Well adult exam  - CBC WITH DIFFERENTIAL; Future  - Comp Metabolic Panel; Future  - HEMOGLOBIN A1C; Future  - Lipid Profile; Future    Other orders  - Levocetirizine Dihydrochloride (XYZAL) 5 MG Tab; Take 5 mg by mouth every day.      Follow-up: Return if symptoms worsen or fail to improve.

## 2020-12-18 RX ORDER — CARBAMAZEPINE 200 MG/1
200 TABLET ORAL
Qty: 90 TAB | Refills: 1 | Status: SHIPPED | OUTPATIENT
Start: 2020-12-18 | End: 2021-06-08

## 2020-12-18 NOTE — TELEPHONE ENCOUNTER
Received request via: Patient    Was the patient seen in the last year in this department? Yes  LOV 11/10/2020 - Telemedicine  Does the patient have an active prescription (recently filled or refills available) for medication(s) requested? No

## 2021-02-24 ENCOUNTER — TELEMEDICINE (OUTPATIENT)
Dept: MEDICAL GROUP | Facility: LAB | Age: 56
End: 2021-02-24
Payer: COMMERCIAL

## 2021-02-24 VITALS — HEIGHT: 73 IN | TEMPERATURE: 97.7 F | BODY MASS INDEX: 28.49 KG/M2 | HEART RATE: 88 BPM | WEIGHT: 215 LBS

## 2021-02-24 DIAGNOSIS — R06.83 SNORING: ICD-10-CM

## 2021-02-24 DIAGNOSIS — L21.9 SEBORRHEIC DERMATITIS OF SCALP: ICD-10-CM

## 2021-02-24 PROCEDURE — 99214 OFFICE O/P EST MOD 30 MIN: CPT | Mod: 95,CR | Performed by: FAMILY MEDICINE

## 2021-02-24 RX ORDER — KETOCONAZOLE 20 MG/ML
SHAMPOO TOPICAL
Qty: 100 ML | Refills: 1 | Status: SHIPPED | OUTPATIENT
Start: 2021-02-24 | End: 2022-04-28

## 2021-02-24 ASSESSMENT — FIBROSIS 4 INDEX: FIB4 SCORE: 1.49

## 2021-02-24 ASSESSMENT — PATIENT HEALTH QUESTIONNAIRE - PHQ9: CLINICAL INTERPRETATION OF PHQ2 SCORE: 0

## 2021-02-24 NOTE — PATIENT INSTRUCTIONS
SLEEP STUDY INSTRUCTIONS    1. Our main concern is to provide the best test and evaluation of your sleep and your cooperation in following the guidelines is very necessary.    2. We have no facilities for family members or guests at the sleep center. Special arrangements will be made for children requiring overnight sleep studies.    3. Unless otherwise instructed, AVOID alcoholic beverages on the day of your sleep study.    4. DO NOT drink coffee or caffeine-containing beverages after 12:00 noon on the day of your sleep study.    5. There is NO smoking at the sleep center.    6. Try to maintain a usual daytime schedule prior to the study (avoid unusual physical activity or meals).    7. DO NOT take a nap on the day of your study.    8. This is an outpatient procedure (test); therefore, nursing services, medications, and meals ARE NOT provided. If you take medications, bring them with you and take them on the schedule you do at home.    9. Please fill your sleep aid prescription (Ambien or Lunesta) and bring to your sleep study. Even patients who normally have no problem going to sleep often need a sleep aid in this different environment.    10. We ask that you wear conventional sleep attire (pajamas or sweats) for the sleep study. We discourage patients from wearing only their underwear to bed. We recommend two-piece pajamas as the techs will need to apply sensors to your stomach.    11. Please shampoo your hair the day of the sleep study. Please DO NOT use any other hair or skin products before your arrival (e.g., mousse, gel, hair or body spray, perfume, body lotion etc.) NOTE: Women should not wear heavy makeup prior to arrival as some wires are taped to the face area.    12. The technician will be applying several small electrodes to the scalp, eye area, chin, chest, and legs, plus respiratory effort belts around the chest. Also, there will be a device placed directly under the nose. (THIS WILL NOT OBSTRUCT  YOUR BREATHING.) This is a painless procedure and the skin is not broken.    13. The test is generally completed in six to eight hours; We are usually done between 6 - 7 a.m., unless you are scheduled for a nap study. You may need to come back another night for a second study to complete your treatment plan.    14. Patients who are scheduled for an MSLT (nap study) will stay at the sleep center for the day following their nighttime study. You will be notified if a nap study was ordered for you at the time the night study is scheduled. Generally, patients having a nap study will leave the sleep center by 4 p.m.    15. You will need to bring food for the following day if you are scheduled for a nap study. A refrigerator and microwave are available.    16. A bathroom is available for your use.    17. We are able to adjust the room temperature for your comfort. Please let the technologist know if you are uncomfortable during the study.    18. If you sleep better with a special pillow or stuffed animal, you may bring it along. Service animals are the only live animals permitted.    19. Cable T.V. is available.    20. You will be scheduled for a follow-up appointment three to five days after the sleep study to review your results.    21. A copy of your sleep study is sent to the referring physician approximately two weeks after your study.    22. Any questions can be directed to our staff at [phone].    23. If CPAP therapy is applied, a home unit will be ordered for you through the [employer] company. You will be contacted to schedule delivery after insurance authorization.

## 2021-02-24 NOTE — PROGRESS NOTES
Virtual Visit: Established Patient   This visit was conducted via Zoom using secure and encrypted videoconferencing technology. The patient was in a private location in the state of [place].    The patient's identity was confirmed and verbal consent was obtained for this virtual visit.    Subjective:   CC:   Chief Complaint   Patient presents with   • Snoring   • Eczema     dry scalp, more spots have appeared       Jaja Marvin Luog is a 56 y.o. male presenting for evaluation and management of:    Scalp itching  A few spots, flaky as well.  Trying over-the-counter shampoos without improvement.    Snoring  Wife has noticed heavy snoring and periods of stopping breathing and will wake up and catch his breath at times.    STOP-BANG score of 4 - intermediate risk of sleep apnea    ROS See HPI    No Known Allergies    Current medicines (including changes today)  Current Outpatient Medications   Medication Sig Dispense Refill   • ketoconazole (NIZORAL) 2 % shampoo Apply 5 to 10 mL to scalp 2 times weekly for three to five minutes before rinsing off. Use for 4 weeks. 100 mL 1   • carBAMazepine (TEGRETOL) 200 MG Tab Take 1 Tab by mouth every day. N THE EVENING 90 Tab 1   • Levocetirizine Dihydrochloride (XYZAL) 5 MG Tab Take 5 mg by mouth every day.     • tadalafil (CIALIS) 10 MG tablet Take 10 mg by mouth as needed for Erectile Dysfunction.       No current facility-administered medications for this visit.       Patient Active Problem List    Diagnosis Date Noted   • NSTEMI (non-ST elevated myocardial infarction) ([facility]) 11/30/2019     Priority: High   • History of seizure disorder ([facility]) 12/01/2019     Priority: Medium   • Dyslipidemia 11/30/2019     Priority: Medium   • 10 year risk of MI or stroke < 7.5% 12/24/2019       Family History   Problem Relation Age of Onset   • Diabetes Father    • Stroke Paternal Grandmother    • Diabetes Paternal Grandfather    • Stroke Maternal Grandfather        He  has a past medical  "history of Hyperlipidemia and Seizure (HCC).  He  has no past surgical history on file.       Objective:   Pulse 88 Comment: Verbal  Temp 36.5 °C (97.7 °F) Comment: Verbal  Ht 1.854 m (6' 1\") Comment: Verbal  Wt 97.5 kg (215 lb) Comment: Verbal  BMI 28.37 kg/m²     Physical Exam:  Constitutional: Alert, no distress, well-groomed.  Skin: No rashes in visible areas.  Eye: Round. Conjunctiva clear, lids normal. No icterus.   ENMT: Lips pink without lesions, good dentition, moist mucous membranes. Phonation normal.  Neck: No masses, no thyromegaly. Moves freely without pain.  Respiratory: Unlabored respiratory effort, no cough or audible wheeze  Psych: Alert and oriented x3, normal affect and mood.       Assessment and Plan:   The following treatment plan was discussed:     1. Seborrheic dermatitis of scalp  No improvement with over-the-counter shampoos.  Ketoconazole shampoo as below.  - ketoconazole (NIZORAL) 2 % shampoo; Apply 5 to 10 mL to scalp 2 times weekly for three to five minutes before rinsing off. Use for 4 weeks.  Dispense: 100 mL; Refill: 1    2. Snoring  Heavy snoring along with witnessed apnea.  Stop bang score of 4 indicates moderate risk.  Order for sleep study and referral to sleep medicine placed.  - Polysomnography, 4 or More; Future  - REFERRAL TO PULMONARY AND SLEEP MEDICINE    Follow-up: Return if symptoms worsen or fail to improve.         "

## 2021-03-11 ENCOUNTER — TELEPHONE (OUTPATIENT)
Dept: SLEEP MEDICINE | Facility: MEDICAL CENTER | Age: 56
End: 2021-03-11

## 2021-03-11 NOTE — TELEPHONE ENCOUNTER
DIRECT SLEEP REFERRAL    REFERRAL/ SLEEP CONSULT NOTE BY [facility] ON 2/24/2021 .    PT CAN HAVE TESTING DONE PRIOR TO NP APPT.    WILL CALL TO SCHEDULE.

## 2021-03-15 DIAGNOSIS — Z23 NEED FOR VACCINATION: ICD-10-CM

## 2021-03-17 ENCOUNTER — IMMUNIZATION (OUTPATIENT)
Dept: FAMILY PLANNING/WOMEN'S HEALTH CLINIC | Facility: IMMUNIZATION CENTER | Age: 56
End: 2021-03-17
Attending: INTERNAL MEDICINE
Payer: COMMERCIAL

## 2021-03-17 DIAGNOSIS — Z23 NEED FOR VACCINATION: ICD-10-CM

## 2021-03-17 DIAGNOSIS — Z23 ENCOUNTER FOR VACCINATION: Primary | ICD-10-CM

## 2021-03-17 PROCEDURE — 91300 PFIZER SARS-COV-2 VACCINE: CPT | Performed by: INTERNAL MEDICINE

## 2021-03-17 PROCEDURE — 0001A PFIZER SARS-COV-2 VACCINE: CPT | Performed by: INTERNAL MEDICINE

## 2021-04-08 ENCOUNTER — IMMUNIZATION (OUTPATIENT)
Dept: FAMILY PLANNING/WOMEN'S HEALTH CLINIC | Facility: IMMUNIZATION CENTER | Age: 56
End: 2021-04-08
Attending: INTERNAL MEDICINE
Payer: COMMERCIAL

## 2021-04-08 DIAGNOSIS — Z23 ENCOUNTER FOR VACCINATION: Primary | ICD-10-CM

## 2021-04-08 PROCEDURE — 91300 PFIZER SARS-COV-2 VACCINE: CPT | Performed by: INTERNAL MEDICINE

## 2021-04-08 PROCEDURE — 0002A PFIZER SARS-COV-2 VACCINE: CPT | Performed by: INTERNAL MEDICINE

## 2021-04-26 ENCOUNTER — HOSPITAL ENCOUNTER (OUTPATIENT)
Dept: LAB | Facility: MEDICAL CENTER | Age: 56
End: 2021-04-26
Attending: FAMILY MEDICINE
Payer: COMMERCIAL

## 2021-04-26 DIAGNOSIS — Z00.00 WELL ADULT EXAM: ICD-10-CM

## 2021-04-26 LAB
ALBUMIN SERPL BCP-MCNC: 4.4 G/DL (ref 3.2–4.9)
ALBUMIN/GLOB SERPL: 1.5 G/DL
ALP SERPL-CCNC: 66 U/L (ref 30–99)
ALT SERPL-CCNC: 28 U/L (ref 2–50)
ANION GAP SERPL CALC-SCNC: 6 MMOL/L (ref 7–16)
AST SERPL-CCNC: 25 U/L (ref 12–45)
BASOPHILS # BLD AUTO: 0.5 % (ref 0–1.8)
BASOPHILS # BLD: 0.03 K/UL (ref 0–0.12)
BILIRUB SERPL-MCNC: 0.3 MG/DL (ref 0.1–1.5)
BUN SERPL-MCNC: 18 MG/DL (ref 8–22)
CALCIUM SERPL-MCNC: 9.5 MG/DL (ref 8.5–10.5)
CHLORIDE SERPL-SCNC: 105 MMOL/L (ref 96–112)
CHOLEST SERPL-MCNC: 245 MG/DL (ref 100–199)
CO2 SERPL-SCNC: 29 MMOL/L (ref 20–33)
CREAT SERPL-MCNC: 0.89 MG/DL (ref 0.5–1.4)
EOSINOPHIL # BLD AUTO: 0.16 K/UL (ref 0–0.51)
EOSINOPHIL NFR BLD: 2.7 % (ref 0–6.9)
ERYTHROCYTE [DISTWIDTH] IN BLOOD BY AUTOMATED COUNT: 40.5 FL (ref 35.9–50)
EST. AVERAGE GLUCOSE BLD GHB EST-MCNC: 100 MG/DL
FASTING STATUS PATIENT QL REPORTED: NORMAL
GLOBULIN SER CALC-MCNC: 2.9 G/DL (ref 1.9–3.5)
GLUCOSE SERPL-MCNC: 93 MG/DL (ref 65–99)
HBA1C MFR BLD: 5.1 % (ref 4–5.6)
HCT VFR BLD AUTO: 48.9 % (ref 42–52)
HDLC SERPL-MCNC: 47 MG/DL
HGB BLD-MCNC: 17.1 G/DL (ref 14–18)
IMM GRANULOCYTES # BLD AUTO: 0.03 K/UL (ref 0–0.11)
IMM GRANULOCYTES NFR BLD AUTO: 0.5 % (ref 0–0.9)
LDLC SERPL CALC-MCNC: 157 MG/DL
LYMPHOCYTES # BLD AUTO: 1.74 K/UL (ref 1–4.8)
LYMPHOCYTES NFR BLD: 29.8 % (ref 22–41)
MCH RBC QN AUTO: 31.9 PG (ref 27–33)
MCHC RBC AUTO-ENTMCNC: 35 G/DL (ref 33.7–35.3)
MCV RBC AUTO: 91.2 FL (ref 81.4–97.8)
MONOCYTES # BLD AUTO: 0.82 K/UL (ref 0–0.85)
MONOCYTES NFR BLD AUTO: 14.1 % (ref 0–13.4)
NEUTROPHILS # BLD AUTO: 3.05 K/UL (ref 1.82–7.42)
NEUTROPHILS NFR BLD: 52.4 % (ref 44–72)
NRBC # BLD AUTO: 0 K/UL
NRBC BLD-RTO: 0 /100 WBC
PLATELET # BLD AUTO: 183 K/UL (ref 164–446)
PMV BLD AUTO: 10.3 FL (ref 9–12.9)
POTASSIUM SERPL-SCNC: 4.2 MMOL/L (ref 3.6–5.5)
PROT SERPL-MCNC: 7.3 G/DL (ref 6–8.2)
RBC # BLD AUTO: 5.36 M/UL (ref 4.7–6.1)
SODIUM SERPL-SCNC: 140 MMOL/L (ref 135–145)
TRIGL SERPL-MCNC: 205 MG/DL (ref 0–149)
WBC # BLD AUTO: 5.8 K/UL (ref 4.8–10.8)

## 2021-04-26 PROCEDURE — 80061 LIPID PANEL: CPT

## 2021-04-26 PROCEDURE — 36415 COLL VENOUS BLD VENIPUNCTURE: CPT

## 2021-04-26 PROCEDURE — 83036 HEMOGLOBIN GLYCOSYLATED A1C: CPT

## 2021-04-26 PROCEDURE — 80053 COMPREHEN METABOLIC PANEL: CPT

## 2021-04-26 PROCEDURE — 85025 COMPLETE CBC W/AUTO DIFF WBC: CPT

## 2021-04-27 PROBLEM — Q24.5 CORONARY-MYOCARDIAL BRIDGE: Status: ACTIVE | Noted: 2019-11-30

## 2021-06-30 ENCOUNTER — SLEEP STUDY (OUTPATIENT)
Dept: SLEEP MEDICINE | Facility: MEDICAL CENTER | Age: 56
End: 2021-06-30
Attending: FAMILY MEDICINE
Payer: COMMERCIAL

## 2021-06-30 DIAGNOSIS — R06.83 SNORING: ICD-10-CM

## 2021-07-06 NOTE — PROCEDURES
Technical summary:The patient underwent a diagnostic polysomnogram. This was a 16 channel montage study to include a 6 channel EEG, a 2 channel EOG, and chin EMG, left and right leg EMG, a snore channel, a nasal pressure transducer, and a nasal oral airflow thermistor.   Respiratory effort was assessed with the use of a thoracic and abdominal monitor and overnight oximetry was obtained. Audio and video recordings were reviewed. This was a fully attended study and sleep stage scoring was performed. The test was technically adequate.       Scoring Criteria: A modification of the the AASM Manual for the Scoring of Sleep and Associated Events. Obstructive apnea was scored by cessation of airflow for at least 10 seconds with continuing respiratory effort.  Central apnea was scored by cessation of airflow for at least 10 seconds with no effort.  Hypopnea was scored by a 30% or more reduction in airflow for at least 10 seconds accompanied by an arterial oxygen desaturation of 3% or more.  (For Medicare patients, hypopneas were scored by a 30% or more reduction in airflow for at least 10 seconds accompanied by an arterial oxygen saturation of 4% or more, as required by their insurance, CMS.    Interpretation:  Study start time was 09:10:55 PM. Diagnostic recording time was 8h 22.0m with a total sleep time of 5h 58.0m resulting in a sleep efficiency of 71.31%%.   Sleep latency from the start of the study was 34 minutes and the latency from sleep to REM was 70 minutes.  In total,119 arousals were scored for an arousal index of 19.9.    Respiratory:  There were a total of 2 apneas consisting of 2 obstructive apneas, 0 mixed apneas, and 0 central apneas. A total of 97 hypopneas were scored.  The apnea index was 0.34 per hour and the hypopnea index was 16.26 per hour resulting in an overall AHI of 16.59.  AHI during rem was 46.1 and AHI while supine was 17.65.    Oximetry:  There was a mean oxygen saturation of 93.0% with a  minimum oxygen saturation of 82.0%. Time spent with oxygen saturations below 89% was 6.5 minutes.    Cardiac:  The highest heart rate seen while awake was 88 BPM while the highest heart rate during sleep was 88 BPM with an average sleeping heart rate of 69 BPM.    Limb Movements:  There were a total of 4 PLMs during sleep, of which 0 were PLMS arousals. This resulted in a PLMS index of 0.7 and a PLMS arousal index of 0.0.  CPAP was tried from to cm H2O.    Impression:  1.  Moderate OAS with AHI of 16.6/hr and O2 juan luis 84 %    Recommendations:  Recommend the patient return for a CPAP titration. In some cases alternative treatment options may prove effective in resolving sleep apnea and these options include upper airway surgery, the use of a dental orthotic or weight loss and positional therapy. Clinical correlation is required. In general patients with sleep apnea are advised to avoid alcohol and sedatives and to not operate a motor vehicle while drowsy and are at a greater risk for cardiovascular disease.

## 2021-07-07 PROCEDURE — 95810 POLYSOM 6/> YRS 4/> PARAM: CPT | Performed by: FAMILY MEDICINE

## 2021-07-19 ENCOUNTER — SLEEP CENTER VISIT (OUTPATIENT)
Dept: SLEEP MEDICINE | Facility: MEDICAL CENTER | Age: 56
End: 2021-07-19
Payer: COMMERCIAL

## 2021-07-19 VITALS
HEIGHT: 73 IN | OXYGEN SATURATION: 95 % | RESPIRATION RATE: 16 BRPM | HEART RATE: 87 BPM | SYSTOLIC BLOOD PRESSURE: 122 MMHG | BODY MASS INDEX: 28.19 KG/M2 | WEIGHT: 212.7 LBS | DIASTOLIC BLOOD PRESSURE: 82 MMHG

## 2021-07-19 DIAGNOSIS — G47.33 OSA (OBSTRUCTIVE SLEEP APNEA): ICD-10-CM

## 2021-07-19 PROCEDURE — 99203 OFFICE O/P NEW LOW 30 MIN: CPT | Performed by: FAMILY MEDICINE

## 2021-07-19 ASSESSMENT — FIBROSIS 4 INDEX: FIB4 SCORE: 1.42

## 2021-07-19 ASSESSMENT — ENCOUNTER SYMPTOMS: SLEEP DISTURBANCE: 1

## 2021-07-19 NOTE — PROGRESS NOTES
"    [facility]  Consult Note     Date: 7/19/2021 / Time: 8:48 AM    Patient ID:   Name:             Jaja Lugo   YOB: 1965  Age:                 56 y.o.  male   MRN:               [id number]      Thank you for requesting a sleep medicine consultation on Jaja Lugo at the sleep center. He presents today with the chief complaints of snoring and occasionally non restful sleep. He is referred by Dr. Story for evaluation and treatment of sleep disorder breathing .     HISTORY OF PRESENT ILLNESS:       At night,  Jaja Lugo goes to bed around 9-10 pm on weekdays and on the weekends. He gets out of bed at 5-6 am on weekdays and on the weekends. He averages about 8 hrs of sleep on a good night and 6 hrs on a bad night. He falls asleep within 5-10 minutes. He rarely wakes up middle of the night.     He is aware of snoring, breathing pauses and gasping in sleep.He  denies any symptoms of restless legs syndrome such as an \"urge to move\"  He  legs in the evening or bedtime. He  denies any symptoms of narcolepsy such as sleep paralysis or cataplexy, or any symptoms to suggest parasomnias such as sleep walking or acting out of dreams. He  has not used any medications for the sleep problem.Overall, he does  finds his sleep refreshing. In terms of  excessive daytime sleepiness,  He complains of sleepiness while  at work, while reading or watching TV and rarely while driving. [place] sleepiness scale score is 6/24.He does take regular naps. The naps are usually 20 min long.He drinks about 1-2 caffeinated beverages per day.    He had a PSG on 6/30/2021 which showedModerate OAS with AHI of 16.6/hr and O2. juan luis 84 %. Time spent with oxygen saturations below 89% was 6.5 minutes.    REVIEW OF SYSTEMS:       Constitutional: Denies fevers, Denies weight changes  Eyes: Denies changes in vision, no eye pain  Ears/Nose/Throat/Mouth: Denies nasal congestion or sore throat "   Cardiovascular: Denies chest pain or palpitations   Respiratory: Denies shortness of breath , Denies cough  Gastrointestinal/Hepatic: Denies abdominal pain, nausea, vomiting, diarrhea, constipation or GI bleeding   Genitourinary: Denies bladder dysfunction, dysuria or frequency  Musculoskeletal/Rheum: Denies  joint pain and swelling   Skin/Breast: Denies rash  Neurological: Denies headache, confusion, memory loss or focal weakness/parasthesias  Psychiatric: denies mood disorder     Comprehensive review of systems form is reviewed with the patient and is attached in the EMR.     PMH:  has a past medical history of Bulgarian measles, Hyperlipidemia, and Seizure (HCC). He also has no past medical history of Chickenpox.  MEDS:   Current Outpatient Medications:   •  carBAMazepine (TEGRETOL) 200 MG Tab, TAKE 1 TAB BY MOUTH EVERY DAY. IN THE EVENING, Disp: 90 tablet, Rfl: 3  •  ketoconazole (NIZORAL) 2 % shampoo, Apply 5 to 10 mL to scalp 2 times weekly for three to five minutes before rinsing off. Use for 4 weeks., Disp: 100 mL, Rfl: 1  •  Levocetirizine Dihydrochloride (XYZAL) 5 MG Tab, Take 5 mg by mouth every day., Disp: , Rfl:   •  tadalafil (CIALIS) 10 MG tablet, Take 10 mg by mouth as needed for Erectile Dysfunction. (Patient not taking: Reported on 7/19/2021), Disp: , Rfl:   ALLERGIES: No Known Allergies  SURGHX: History reviewed. No pertinent surgical history.  SOCHX:  reports that he has never smoked. He has never used smokeless tobacco. He reports previous alcohol use. He reports that he does not use drugs.   FH:   Family History   Problem Relation Age of Onset   • Diabetes Father    • Stroke Paternal Grandmother    • Diabetes Paternal Grandfather    • Stroke Maternal Grandfather    • Sleep Apnea Neg Hx        Physical Exam:  Vitals/ General Appearance:   Weight/BMI: Body mass index is 28.06 kg/m².  /82 (BP Location: Right arm, Patient Position: Sitting, BP Cuff Size: Adult)   Pulse 87   Resp 16   Ht  "1.854 m (6' 1\")   Wt 96.5 kg (212 lb 11.2 oz)   SpO2 95%   Vitals:    07/19/21 0843   BP: 122/82   BP Location: Right arm   Patient Position: Sitting   BP Cuff Size: Adult   Pulse: 87   Resp: 16   SpO2: 95%   Weight: 96.5 kg (212 lb 11.2 oz)   Height: 1.854 m (6' 1\")           Constitutional: Alert, no distress, well-groomed.  Skin: No rashes in visible areas.  Eye: Round. Conjunctiva clear, lids normal. No icterus.   ENMT: Lips pink without lesions, good dentition, moist mucous membranes. Phonation normal.  Neck: No masses, no thyromegaly. Moves freely without pain.  CV: Pulse as reported by patient  Respiratory: Unlabored respiratory effort, no cough or audible wheeze  Psych: Alert and oriented x3, normal affect and mood.   INVESTIGATIONS:     ASSESSMENT AND PLAN     1. Sleep Apnea :    The pathophysiology of sleep anea and the increased risk of cardiovascular morbidity from untreated sleep apnea is discussed in detail with the patient.  He is urged to avoid supine sleep, weight gain and alcoholic beverages since all of these can worsen sleep apnea. He is cautioned against drowsy driving. If He feels sleepy while driving, He must pull over for a break/nap, rather than persist on the road, in the interest of He own safety and that of others on the road.     Plan   - Auto CPAP vs overnight CPAP titration vs dental appliance and surgeries was  dicussed in detail. After informed discussion he would like to try mandibular  advancement device    - PSG was reviewed and discussed with the pt   - compliance was reinforced     2. Regarding treatment of other past medical problems and general health maintenance,  He is urged to follow up with PCP.        "

## 2021-08-08 ENCOUNTER — PATIENT MESSAGE (OUTPATIENT)
Dept: MEDICAL GROUP | Facility: LAB | Age: 56
End: 2021-08-08

## 2021-08-08 DIAGNOSIS — N52.9 ERECTILE DYSFUNCTION, UNSPECIFIED ERECTILE DYSFUNCTION TYPE: ICD-10-CM

## 2021-08-09 RX ORDER — TADALAFIL 10 MG/1
10 TABLET ORAL PRN
Qty: 10 TABLET | Refills: 2 | Status: SHIPPED | OUTPATIENT
Start: 2021-08-09 | End: 2021-08-09

## 2021-08-09 NOTE — TELEPHONE ENCOUNTER
----- Message from Jaja Lugo sent at 8/8/2021  4:54 PM PDT -----  Regarding: Prescription Question  Contact: [phone]  I'd like to refill Cialis but can't online

## 2021-08-10 RX ORDER — SILDENAFIL 50 MG/1
50 TABLET, FILM COATED ORAL PRN
Qty: 10 TABLET | Refills: 6 | Status: SHIPPED | OUTPATIENT
Start: 2021-08-10 | End: 2021-08-13

## 2021-08-13 DIAGNOSIS — N52.9 ERECTILE DYSFUNCTION, UNSPECIFIED ERECTILE DYSFUNCTION TYPE: ICD-10-CM

## 2021-08-13 RX ORDER — SILDENAFIL 50 MG/1
50 TABLET, FILM COATED ORAL PRN
Qty: 10 TABLET | Refills: 0 | OUTPATIENT
Start: 2021-08-13

## 2021-08-13 RX ORDER — TADALAFIL 10 MG/1
10 TABLET ORAL PRN
Qty: 10 TABLET | Refills: 3 | Status: SHIPPED | OUTPATIENT
Start: 2021-08-13 | End: 2022-01-28 | Stop reason: SDUPTHER

## 2021-09-07 ENCOUNTER — APPOINTMENT (OUTPATIENT)
Dept: SLEEP MEDICINE | Facility: MEDICAL CENTER | Age: 56
End: 2021-09-07

## 2021-11-03 ENCOUNTER — APPOINTMENT (OUTPATIENT)
Dept: SLEEP MEDICINE | Facility: MEDICAL CENTER | Age: 56
End: 2021-11-03

## 2022-01-28 ENCOUNTER — PATIENT MESSAGE (OUTPATIENT)
Dept: MEDICAL GROUP | Facility: LAB | Age: 57
End: 2022-01-28

## 2022-01-28 DIAGNOSIS — N52.9 ERECTILE DYSFUNCTION, UNSPECIFIED ERECTILE DYSFUNCTION TYPE: ICD-10-CM

## 2022-01-29 NOTE — TELEPHONE ENCOUNTER
----- Message from Jaja Lugo sent at 1/28/2022  4:02 PM PST -----  Regarding: Cialis refill  Can I get this prescription renewed and move to [facility]?  Much cheaper than [facility]

## 2022-01-31 RX ORDER — TADALAFIL 10 MG/1
10 TABLET ORAL PRN
Qty: 10 TABLET | Refills: 3 | Status: SHIPPED | OUTPATIENT
Start: 2022-01-31 | End: 2022-02-28 | Stop reason: SDUPTHER

## 2022-02-28 DIAGNOSIS — N52.9 ERECTILE DYSFUNCTION, UNSPECIFIED ERECTILE DYSFUNCTION TYPE: ICD-10-CM

## 2022-02-28 RX ORDER — TADALAFIL 10 MG/1
10 TABLET ORAL PRN
Qty: 10 TABLET | Refills: 3 | Status: SHIPPED | OUTPATIENT
Start: 2022-02-28 | End: 2022-02-28 | Stop reason: SDUPTHER

## 2022-02-28 RX ORDER — TADALAFIL 10 MG/1
10 TABLET ORAL PRN
Qty: 10 TABLET | Refills: 3 | Status: SHIPPED | OUTPATIENT
Start: 2022-02-28 | End: 2023-09-17 | Stop reason: SDUPTHER

## 2022-02-28 NOTE — TELEPHONE ENCOUNTER
----- Message from Jaja Lugo sent at 2/27/2022  7:46 AM PST -----  Regarding: Cialis refill  Originally messaged you on 1/28 and have not heard back…    Can I get this prescription renewed and move to [facility] [place]?  Much cheaper than [facility]

## 2022-02-28 NOTE — TELEPHONE ENCOUNTER
----- Message from Jaja Lugo sent at 2/28/2022  8:55 AM PST -----  Regarding: Cialis refill  did I read it correctly that you authorized the refill?  [facility] and I want to [facility] it to [facility] on [facility]

## 2022-02-28 NOTE — TELEPHONE ENCOUNTER
Received request via: Patient    Was the patient seen in the last year in this department? Yes  9/23/21  Does the patient have an active prescription (recently filled or refills available) for medication(s) requested? No

## 2022-04-28 ENCOUNTER — OFFICE VISIT (OUTPATIENT)
Dept: MEDICAL GROUP | Facility: LAB | Age: 57
End: 2022-04-28
Payer: COMMERCIAL

## 2022-04-28 ENCOUNTER — HOSPITAL ENCOUNTER (OUTPATIENT)
Dept: RADIOLOGY | Facility: MEDICAL CENTER | Age: 57
End: 2022-04-28
Attending: FAMILY MEDICINE
Payer: COMMERCIAL

## 2022-04-28 VITALS
HEIGHT: 73 IN | DIASTOLIC BLOOD PRESSURE: 82 MMHG | HEART RATE: 88 BPM | RESPIRATION RATE: 14 BRPM | OXYGEN SATURATION: 94 % | BODY MASS INDEX: 28.76 KG/M2 | SYSTOLIC BLOOD PRESSURE: 124 MMHG | TEMPERATURE: 97.2 F | WEIGHT: 217 LBS

## 2022-04-28 DIAGNOSIS — K11.9 LESION OF PAROTID GLAND: ICD-10-CM

## 2022-04-28 DIAGNOSIS — K11.21 ACUTE PAROTITIS: ICD-10-CM

## 2022-04-28 PROCEDURE — 76536 US EXAM OF HEAD AND NECK: CPT

## 2022-04-28 PROCEDURE — 99214 OFFICE O/P EST MOD 30 MIN: CPT | Performed by: FAMILY MEDICINE

## 2022-04-28 RX ORDER — AMOXICILLIN AND CLAVULANATE POTASSIUM 875; 125 MG/1; MG/1
1 TABLET, FILM COATED ORAL 2 TIMES DAILY
Qty: 20 TABLET | Refills: 0 | Status: SHIPPED | OUTPATIENT
Start: 2022-04-28 | End: 2022-05-08

## 2022-04-28 ASSESSMENT — FIBROSIS 4 INDEX: FIB4 SCORE: 1.45

## 2022-04-28 ASSESSMENT — PATIENT HEALTH QUESTIONNAIRE - PHQ9: CLINICAL INTERPRETATION OF PHQ2 SCORE: 0

## 2022-04-28 NOTE — PROGRESS NOTES
"Subjective:     CC: Face swelling    HPI:   Jaja presents today with turn for right-sided face swelling.  Began about 4 days ago and has been increasing since then.  He has some mild tenderness and redness to the area as well.  No fevers, no injury.  Has not noticed any pain with sour foods.  No recent radiation.  He had dental work a few weeks ago but this was on the left upper teeth. No current tooth or dental pain.    Medications, past medical history, allergies, and social history have been reviewed and updated.      Objective:       Exam:  /82 (BP Location: Right arm, Patient Position: Sitting, BP Cuff Size: Adult)   Pulse 88   Temp 36.2 °C (97.2 °F)   Resp 14   Ht 1.854 m (6' 1\")   Wt 98.4 kg (217 lb)   SpO2 94%   BMI 28.63 kg/m²  Body mass index is 28.63 kg/m².    Constitutional: Alert. Well appearing. No distress.  Skin: Warm, dry, good turgor, no visible rashes.  Eye: Equal, round and reactive to light, conjunctiva clear, lids normal.  HEENT: Significant swelling and firmness to the right parotid gland.  No obvious area of fluctuance.  Minimal erythema, mild tenderness.  No cervical LAD.  No obvious purulent discharge from salivary ducts.  Range of motion of the neck is intact, he does have some mild pain with opening mouth.  Respiratory: Normal effort.   Neuro: Moves all four extremities. No facial droop.  Psych: Answers questions appropriately. Normal affect and mood.    Assessment & Plan:     57 y.o. male with the following -     1. Acute parotitis  Tenderness and significant swelling to the right parotid gland.  Suspect parotitis secondary to likely blockage.  Vitals normal, he is otherwise well-appearing, and currently without signs of deeper infection.  Start Augmentin and ultrasound to further evaluate.  Extensively discussed ER precautions for worsening symptoms or signs of systemic illness.  - amoxicillin-clavulanate (AUGMENTIN) 875-125 MG Tab; Take 1 Tablet by mouth 2 times a day for " 10 days.  Dispense: 20 Tablet; Refill: 0  - US-SOFT TISSUES OF HEAD - NECK; Future    Please note that this note was created using voice recognition software.

## 2022-04-29 ENCOUNTER — HOSPITAL ENCOUNTER (EMERGENCY)
Facility: MEDICAL CENTER | Age: 57
End: 2022-04-29
Attending: EMERGENCY MEDICINE
Payer: COMMERCIAL

## 2022-04-29 ENCOUNTER — APPOINTMENT (OUTPATIENT)
Dept: RADIOLOGY | Facility: MEDICAL CENTER | Age: 57
End: 2022-04-29
Attending: EMERGENCY MEDICINE
Payer: COMMERCIAL

## 2022-04-29 VITALS
TEMPERATURE: 97.8 F | HEART RATE: 85 BPM | WEIGHT: 218.48 LBS | HEIGHT: 73 IN | OXYGEN SATURATION: 92 % | DIASTOLIC BLOOD PRESSURE: 96 MMHG | RESPIRATION RATE: 20 BRPM | BODY MASS INDEX: 28.96 KG/M2 | SYSTOLIC BLOOD PRESSURE: 144 MMHG

## 2022-04-29 VITALS
DIASTOLIC BLOOD PRESSURE: 81 MMHG | OXYGEN SATURATION: 96 % | TEMPERATURE: 98.1 F | HEART RATE: 88 BPM | WEIGHT: 217.37 LBS | HEIGHT: 73 IN | RESPIRATION RATE: 16 BRPM | BODY MASS INDEX: 28.81 KG/M2 | SYSTOLIC BLOOD PRESSURE: 138 MMHG

## 2022-04-29 DIAGNOSIS — K11.3 PAROTID ABSCESS: ICD-10-CM

## 2022-04-29 DIAGNOSIS — K11.8 PAROTID MASS: ICD-10-CM

## 2022-04-29 DIAGNOSIS — R22.0 RIGHT FACIAL SWELLING: ICD-10-CM

## 2022-04-29 DIAGNOSIS — K11.20 PAROTITIS: ICD-10-CM

## 2022-04-29 LAB
ANION GAP SERPL CALC-SCNC: 10 MMOL/L (ref 7–16)
BASOPHILS # BLD AUTO: 0.5 % (ref 0–1.8)
BASOPHILS # BLD: 0.05 K/UL (ref 0–0.12)
BUN SERPL-MCNC: 21 MG/DL (ref 8–22)
CALCIUM SERPL-MCNC: 9.8 MG/DL (ref 8.4–10.2)
CHLORIDE SERPL-SCNC: 100 MMOL/L (ref 96–112)
CO2 SERPL-SCNC: 25 MMOL/L (ref 20–33)
CREAT SERPL-MCNC: 0.88 MG/DL (ref 0.5–1.4)
EOSINOPHIL # BLD AUTO: 0.13 K/UL (ref 0–0.51)
EOSINOPHIL NFR BLD: 1.2 % (ref 0–6.9)
ERYTHROCYTE [DISTWIDTH] IN BLOOD BY AUTOMATED COUNT: 40.1 FL (ref 35.9–50)
GFR SERPLBLD CREATININE-BSD FMLA CKD-EPI: 101 ML/MIN/1.73 M 2
GLUCOSE SERPL-MCNC: 91 MG/DL (ref 65–99)
HCT VFR BLD AUTO: 49.2 % (ref 42–52)
HGB BLD-MCNC: 16.9 G/DL (ref 14–18)
IMM GRANULOCYTES # BLD AUTO: 0.06 K/UL (ref 0–0.11)
IMM GRANULOCYTES NFR BLD AUTO: 0.6 % (ref 0–0.9)
LYMPHOCYTES # BLD AUTO: 1.86 K/UL (ref 1–4.8)
LYMPHOCYTES NFR BLD: 17.3 % (ref 22–41)
MCH RBC QN AUTO: 31.2 PG (ref 27–33)
MCHC RBC AUTO-ENTMCNC: 34.3 G/DL (ref 33.7–35.3)
MCV RBC AUTO: 90.9 FL (ref 81.4–97.8)
MONOCYTES # BLD AUTO: 1.44 K/UL (ref 0–0.85)
MONOCYTES NFR BLD AUTO: 13.4 % (ref 0–13.4)
NEUTROPHILS # BLD AUTO: 7.2 K/UL (ref 1.82–7.42)
NEUTROPHILS NFR BLD: 67 % (ref 44–72)
NRBC # BLD AUTO: 0 K/UL
NRBC BLD-RTO: 0 /100 WBC
PLATELET # BLD AUTO: 197 K/UL (ref 164–446)
PMV BLD AUTO: 10.1 FL (ref 9–12.9)
POTASSIUM SERPL-SCNC: 4.3 MMOL/L (ref 3.6–5.5)
RBC # BLD AUTO: 5.41 M/UL (ref 4.7–6.1)
SODIUM SERPL-SCNC: 135 MMOL/L (ref 135–145)
WBC # BLD AUTO: 10.7 K/UL (ref 4.8–10.8)

## 2022-04-29 PROCEDURE — 70487 CT MAXILLOFACIAL W/DYE: CPT

## 2022-04-29 PROCEDURE — 36415 COLL VENOUS BLD VENIPUNCTURE: CPT

## 2022-04-29 PROCEDURE — 80048 BASIC METABOLIC PNL TOTAL CA: CPT

## 2022-04-29 PROCEDURE — 85025 COMPLETE CBC W/AUTO DIFF WBC: CPT

## 2022-04-29 PROCEDURE — 700117 HCHG RX CONTRAST REV CODE 255: Performed by: EMERGENCY MEDICINE

## 2022-04-29 PROCEDURE — 99283 EMERGENCY DEPT VISIT LOW MDM: CPT

## 2022-04-29 PROCEDURE — 99284 EMERGENCY DEPT VISIT MOD MDM: CPT

## 2022-04-29 RX ORDER — COVID-19 ANTIGEN TEST
220-440 KIT MISCELLANEOUS 2 TIMES DAILY PRN
Status: SHIPPED | COMMUNITY
End: 2023-12-21

## 2022-04-29 RX ADMIN — IOHEXOL 50 ML: 350 INJECTION, SOLUTION INTRAVENOUS at 11:30

## 2022-04-29 ASSESSMENT — FIBROSIS 4 INDEX
FIB4 SCORE: 1.45
FIB4 SCORE: 1.34

## 2022-04-29 NOTE — ED PROVIDER NOTES
ED Provider Note    CHIEF COMPLAINT  Chief Complaint   Patient presents with   • Sent by MD     Transfer from [facility]. RT sided facial swelling x5 days. No ENT on there today.    CT:Multiloculated, peripherally enhancing structure with central low attenuation in the right parotid gland suspicious for abscess in light of surrounding inflammatory change and pain. A neoplastic process is also a possibility.    • Facial Swelling   • Abscess       HPI  Jaja Lugo is a 57 y.o. male who presents to the emergency department with known right carotid abscess and/or neoplasm.  Patient started developing swelling about 5 days ago.  Never had a problem prior to this.  He had gradually worsened while he was out of town.  Seen by primary provider yesterday.  An ultrasound was obtained demonstrating parotid gland lesion measuring 2.1 cm.  Tissue sampling was recommended.  Patient was started on Augmentin with presumed diagnosis of parotitis.  He was told he needed to see ENT and was given a referral.  He has not been able to establish an appointment and therefore went to [facility] emergency department.  He underwent CT scan that demonstrated multiloculated peripherally enhancing structure with central low-attenuation in the right parotid gland suspicious for abscess.  Neoplastic process was reported a possibility.  Patient has not had fever or elevated WBC count.  Outside facility was unable to get in contact with ENT and therefore the patient was transferred here.  No difficulty breathing or swallowing.  No dental pain.    REVIEW OF SYSTEMS  As per HPI, otherwise a 10 point review of systems is negative    PAST MEDICAL HISTORY  Past Medical History:   Diagnosis Date   • Mongolian measles    • Hyperlipidemia    • Seizure (HCC)        SOCIAL HISTORY  Social History     Tobacco Use   • Smoking status: Never Smoker   • Smokeless tobacco: Never Used   Vaping Use   • Vaping Use: Never used   Substance Use Topics   •  "Alcohol use: Not Currently     Comment: Rarely   • Drug use: Never       SURGICAL HISTORY  History reviewed. No pertinent surgical history.    CURRENT MEDICATIONS  Home Medications     Reviewed by Kam Nazario R.N. (Registered Nurse) on 04/29/22 at 1309  Med List Status: Partial   Medication Last Dose Status   amoxicillin-clavulanate (AUGMENTIN) 875-125 MG Tab 4/29/2022 Active   carBAMazepine (TEGRETOL) 200 MG Tab  Active   Levocetirizine Dihydrochloride 5 MG Tab  Active   Naproxen Sodium 220 MG Cap  Active   tadalafil (CIALIS) 10 MG tablet  Active                ALLERGIES  No Known Allergies    PHYSICAL EXAM  VITAL SIGNS: /81   Pulse 88   Temp 36.7 °C (98.1 °F)   Resp 16   Ht 1.854 m (6' 1\")   Wt 98.6 kg (217 lb 6 oz)   SpO2 96%   BMI 28.68 kg/m²    Constitutional: Awake and alert  HENT: Swelling and tenderness overlying the right parotid gland without overlying erythema.  No fluctuance palpated.  Tenderness feels like a bruise.  No intraoral lesion noted.  No purulent drainage from the duct.  Eyes: Normal inspection  Neck: Grossly normal range of motion.  Cardiovascular: Normal heart rate  Thorax & Lungs: No respiratory distress,  Skin: No  rash.  Neurologic: Grossly normal   Psychiatric: Normal for situation        COURSE & MEDICAL DECISION MAKING  Patient with history and physical as above.  Exam is most consistent with parotitis.  Had CT scan questionable neoplastic process.  He was just started on antibiotics yesterday and has not seen response.  ENT was paged.    Discussed case with Dr. Frye.  She reviewed the chart.  Stated that if the patient was nontoxic without hortencia cellulitis will be appropriate for continued antibiotics and follow-up in the office.  We will continue Augmentin.  If he has worsening over the weekend Dr. Frye advised return to ER.  Otherwise he should contact her office on Monday and they will set up an appointment.  Patient was advised warm compresses and " sialagogues.  Return to ER for increased swelling, redness, high fever or concern.    FINAL IMPRESSION  1.  Parotitis  2.  Rule out parotid neoplasm      This dictation was created using voice recognition software. The accuracy of the dictation is limited to the abilities of the software.  The nursing notes were reviewed and certain aspects of this information were incorporated into this note.      Electronically signed by: Jey Bassett M.D., 4/29/2022 3:01 PM

## 2022-04-29 NOTE — ED PROVIDER NOTES
ED Provider Note    Scribed for Jarrett De Leon M.D. by Zack Jaimes. 4/29/2022, 10:56 AM.    Primary care provider: Ran Story M.D.  Means of arrival: Walk-in  History obtained from: Patient  History limited by: None    CHIEF COMPLAINT  Chief Complaint   Patient presents with    Facial Pain    Facial Swelling     HPI  Jaja Lugo is a 57 y.o. male who presents to the Emergency Department for acute, moderate right facial swelling onset Monday. Per patient, he first noticed his facial swelling at a conference in [place], [place]. He went to his primary care provider yesterday and had an ultrasound which showed a lesion and was prescribed amoxicillin. He presents to the emergency department today due to the worsening swelling. He additionally notes he is unable to schedule an appointment with ENT anytime soon and did not want to wait for further evaluation. He has associated symptoms of right facial pain, but denies fever. No alleviating or exacerbating factors reported.    REVIEW OF SYSTEMS  Pertinent positives include right facial swelling and right facial pain.   Pertinent negatives include no fever.    All other systems reviewed and negative.    PAST MEDICAL HISTORY   has a past medical history of [place] measles, Hyperlipidemia, and Seizure (HCC).    SURGICAL HISTORY  patient denies any surgical history    SOCIAL HISTORY  Social History     Tobacco Use    Smoking status: Never Smoker    Smokeless tobacco: Never Used   Vaping Use    Vaping Use: Never used   Substance Use Topics    Alcohol use: Not Currently     Comment: Rarely    Drug use: Never      Social History     Substance and Sexual Activity   Drug Use Never       FAMILY HISTORY  Family History   Problem Relation Age of Onset    Diabetes Father     Stroke Paternal Grandmother     Diabetes Paternal Grandfather     Stroke Maternal Grandfather     Sleep Apnea Neg Hx        CURRENT MEDICATIONS  Current Outpatient Medications   Medication  "Instructions    amoxicillin-clavulanate (AUGMENTIN) 875-125 MG Tab 1 Tablet, Oral, 2 TIMES DAILY    carBAMazepine (TEGRETOL) 200 MG Tab TAKE 1 TAB BY MOUTH EVERY DAY. IN THE EVENING    Levocetirizine Dihydrochloride 5 mg, Oral, DAILY    tadalafil (CIALIS) 10 mg, Oral, PRN     ALLERGIES  No Known Allergies    PHYSICAL EXAM  VITAL SIGNS: /104   Pulse 94   Temp 36.8 °C (98.3 °F) (Temporal)   Resp 19   Ht 1.854 m (6' 1\")   Wt 99.1 kg (218 lb 7.6 oz)   SpO2 95%   BMI 28.82 kg/m²     Nursing note and vitals reviewed.  Constitutional: No distress.   HENT: Head is atraumatic. Oropharynx is moist. Swelling of the right parotid gland with associated tenderness, No erythema.  Eyes: Conjunctivae are normal. Pupils are equal, round, and reactive to light.   Cardiovascular: Normal peripheral perfusion  Respiratory: No respiratory distress.   Musculoskeletal: Normal range of motion.   Neurological: Alert. No focal deficits noted.    Skin: No rash.   Psych: Appropriate for clinical situation     DIAGNOSTIC STUDIES / PROCEDURES    LABS  Results for orders placed or performed during the hospital encounter of 04/29/22   CBC WITH DIFFERENTIAL   Result Value Ref Range    WBC 10.7 4.8 - 10.8 K/uL    RBC 5.41 4.70 - 6.10 M/uL    Hemoglobin 16.9 14.0 - 18.0 g/dL    Hematocrit 49.2 42.0 - 52.0 %    MCV 90.9 81.4 - 97.8 fL    MCH 31.2 27.0 - 33.0 pg    MCHC 34.3 33.7 - 35.3 g/dL    RDW 40.1 35.9 - 50.0 fL    Platelet Count 197 164 - 446 K/uL    MPV 10.1 9.0 - 12.9 fL    Neutrophils-Polys 67.00 44.00 - 72.00 %    Lymphocytes 17.30 (L) 22.00 - 41.00 %    Monocytes 13.40 0.00 - 13.40 %    Eosinophils 1.20 0.00 - 6.90 %    Basophils 0.50 0.00 - 1.80 %    Immature Granulocytes 0.60 0.00 - 0.90 %    Nucleated RBC 0.00 /100 WBC    Neutrophils (Absolute) 7.20 1.82 - 7.42 K/uL    Lymphs (Absolute) 1.86 1.00 - 4.80 K/uL    Monos (Absolute) 1.44 (H) 0.00 - 0.85 K/uL    Eos (Absolute) 0.13 0.00 - 0.51 K/uL    Baso (Absolute) 0.05 0.00 - " 0.12 K/uL    Immature Granulocytes (abs) 0.06 0.00 - 0.11 K/uL    NRBC (Absolute) 0.00 K/uL     All labs reviewed by me.    RADIOLOGY  CT-MAXILLOFACIAL WITH PLUS RECONS   Final Result      1.  Multiloculated, peripherally enhancing structure with central low attenuation in the right parotid gland suspicious for abscess in light of surrounding inflammatory change and pain. A neoplastic process is also a possibility.      2.  Mild prominent cervical lymph nodes.        The radiologist's interpretation of all radiological studies have been reviewed by me.    COURSE & MEDICAL DECISION MAKING  Nursing notes, VS, PMSFHx reviewed in chart.    Review of past medical records shows the patient had an ultrasound yesterday which demonstrated right parotid lesion.     10:56 AM - Patient seen and examined at bedside. Discussed plan of care, including labs and radiology. Patient agrees to the plan of care. Ordered CT-Maxillofacial w/, CBC w/ diff, and BMP to evaluate his symptoms. The differential diagnosis include but are not limited to: Parotiditis vs. Parotid Mass vs. Sialolithiasis     12:17 PM - Patient was reevaluated at bedside. I informed him his radiology results show an abscess in his right parotid gland. I advised he present to [facility] due to [facility] not having an ENT on call. Discussed plans and precautions for transfer. Patient understands and agrees to the plan of transfer and discharge.    Patient will be transferred by private vehicle to [facility].  I spoke with the transfer center.  Dr. Starkey is accepting.    DISPOSITION:  Patient will be transferred to [facility] in stable condition.    FINAL IMPRESSION  1. Right facial swelling    2. Parotid mass    3. Parotid abscess       Zack PORTER (James), am scribing for, and in the presence of, Jarrett De Leon M.D..    Electronically signed by: Zack Jaimes (James), 4/29/2022    Jarrett PORTER M.D. personally  performed the services described in this documentation, as scribed by Zack Jaimes in my presence, and it is both accurate and complete.    The note accurately reflects work and decisions made by me.  Jarrett De Leon M.D.  4/29/2022  1:23 PM

## 2022-04-29 NOTE — ED NOTES
Med rec completed per pt   Allergies reviewed    Pt started a 10 day course of Augmentin yesterday 4/28/2022

## 2022-04-29 NOTE — ED TRIAGE NOTES
Pt c/o R sided facial pain and swelling that started on Monday. Pt saw PCP and had an US ordered that showed a lesion. Pt was referred to ENT but they cant get him in for a while and pt states the pain is too bad

## 2022-04-29 NOTE — ED TRIAGE NOTES
"Chief Complaint   Patient presents with   • Sent by MD     Transfer from [facility]. RT sided facial swelling x5 days. No ENT on there today.    CT:Multiloculated, peripherally enhancing structure with central low attenuation in the right parotid gland suspicious for abscess in light of surrounding inflammatory change and pain. A neoplastic process is also a possibility.    • Facial Swelling   • Abscess     Pt to triage for above. Has been taking Augmentin. Last dose was this morning at 0800.     /95   Pulse 93   Temp 36.7 °C (98 °F) (Temporal)   Resp 18   Ht 1.854 m (6' 1\")   Wt 98.6 kg (217 lb 6 oz)   SpO2 96%   BMI 28.68 kg/m²     "

## 2022-04-29 NOTE — DISCHARGE PLANNING
Anticipated Discharge Disposition:  [facility] ER    Action:  ER to ER transfer by private vehicle. Chart copy and cobra completed and sent with pt. OK to go private vehicle . Stable.  Dr De Leon sending and Dr Starkey  Receiving . Check in at ER Desk    Barriers to Discharge: Transport     Plan: No further needs

## 2022-04-29 NOTE — ED NOTES
Report called to [facility] charge RN. Pt will be driving self to [facility]. All belongings and paperwork sent with patient. IV in place upon transfer. VSS. Pt off unit without incident.

## 2022-05-11 ENCOUNTER — HOSPITAL ENCOUNTER (OUTPATIENT)
Dept: RADIOLOGY | Facility: MEDICAL CENTER | Age: 57
End: 2022-05-11
Attending: OTOLARYNGOLOGY
Payer: COMMERCIAL

## 2022-05-11 DIAGNOSIS — D37.030 NEOPLASM OF UNCERTAIN BEHAVIOR OF PAROTID GLAND: ICD-10-CM

## 2022-05-11 LAB — CYTOLOGY REG CYTOL: NORMAL

## 2022-05-11 PROCEDURE — 88305 TISSUE EXAM BY PATHOLOGIST: CPT

## 2022-05-11 PROCEDURE — 88112 CYTOPATH CELL ENHANCE TECH: CPT

## 2022-05-11 PROCEDURE — 10005 FNA BX W/US GDN 1ST LES: CPT

## 2022-05-11 NOTE — PROGRESS NOTES
"Patient given [facility] \"Preventing the Spread of Infection\" brochure upon arrival.   ?  US guided RIGHT PAROTID MASS fine needle aspiration done by Dr. FERRER; NON-SEDATION  (no H&P required as this is a NON SEDATION procedure) RIGHT anterior aspect of neck access site; 1 jar of cytolyt obtained and sent to pathology lab; pt tolerated the procedure well; pt thermodynamically stable pre/intra/post procedure; all questions and concerns answered prior to being d/c; patient provided with appropriate education for procedure; pt d/c home.      "

## 2022-05-24 RX ORDER — CARBAMAZEPINE 200 MG/1
TABLET ORAL
Qty: 90 TABLET | Refills: 3 | Status: SHIPPED | OUTPATIENT
Start: 2022-05-24 | End: 2023-05-15

## 2022-08-02 ENCOUNTER — TELEMEDICINE (OUTPATIENT)
Dept: MEDICAL GROUP | Facility: LAB | Age: 57
End: 2022-08-02
Payer: COMMERCIAL

## 2022-08-02 VITALS — WEIGHT: 215 LBS | BODY MASS INDEX: 28.49 KG/M2 | HEIGHT: 73 IN

## 2022-08-02 DIAGNOSIS — L01.00 IMPETIGO: ICD-10-CM

## 2022-08-02 DIAGNOSIS — L21.9 SEBORRHEIC DERMATITIS: ICD-10-CM

## 2022-08-02 PROCEDURE — 99214 OFFICE O/P EST MOD 30 MIN: CPT | Mod: 95 | Performed by: FAMILY MEDICINE

## 2022-08-02 RX ORDER — KETOCONAZOLE 20 MG/G
1 CREAM TOPICAL DAILY
Qty: 15 G | Refills: 0 | Status: SHIPPED | OUTPATIENT
Start: 2022-08-02 | End: 2023-12-21

## 2022-08-02 RX ORDER — CEPHALEXIN 500 MG/1
500 CAPSULE ORAL 4 TIMES DAILY
Qty: 28 CAPSULE | Refills: 0 | Status: CANCELLED | OUTPATIENT
Start: 2022-08-02 | End: 2022-08-09

## 2022-08-02 ASSESSMENT — FIBROSIS 4 INDEX: FIB4 SCORE: 1.34

## 2022-08-02 NOTE — PROGRESS NOTES
"Virtual Visit: Established Patient   This visit was conducted via Zoom using secure and encrypted videoconferencing technology.   The patient was in their home in the state of [place].    The patient's identity was confirmed and verbal consent was obtained for this virtual visit.    Subjective:   CC:   Chief Complaint   Patient presents with   • Rash     Left corner of mouth x 1 week, itching, some pain, redness, some spreading, some puss and crusting     Jaja Marvin Lugo is a 57 y.o. male presenting for evaluation and management of:    Rash  For the last week he has had pink/red rash to the left of the lip with yellow crust in the mornings, may have started with a small cut with shaving.  He also has more chronic rash to the scalp and edges of the hairline that is dry with white flakes.  No fevers.    He sends in pictures which are reviewed.  See my chart message.    ROS   See HPI    Current medicines (including changes today)  Current Outpatient Medications   Medication Sig Dispense Refill   • carBAMazepine (TEGRETOL) 200 MG Tab TAKE 1 TAB BY MOUTH EVERY DAY. IN THE EVENING 90 Tablet 3   • Naproxen Sodium 220 MG Cap Take 220-440 mg by mouth 2 times a day as needed (Pain).     • tadalafil (CIALIS) 10 MG tablet Take 1 Tablet by mouth as needed for Erectile Dysfunction (Max once daily). 10 Tablet 3   • Levocetirizine Dihydrochloride 5 MG Tab Take 5 mg by mouth every day.       No current facility-administered medications for this visit.       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  Eye: Round. Conjunctiva clear, lids normal. No icterus.   ENMT: Lips pink " without lesions, good dentition, moist mucous membranes. Phonation normal.  Neck: No masses, no thyromegaly. Moves freely without pain.  Respiratory: Unlabored respiratory effort, no cough or audible wheeze  Psych: Alert and oriented x3, normal affect and mood.     Assessment and Plan:   The following treatment plan was discussed:     1. Impetigo  Erythema with overlying yellow crust to the left of the lips.  Start Bactroban given limited area, can do oral Keflex if not responding.  - mupirocin (BACTROBAN) 2 % Ointment; Apply 1 Application topically 2 times a day. Use next to lip  Dispense: 22 g; Refill: 0    2. Seborrheic dermatitis  Chronic, minimal improvement with shampoo.  Will try ketoconazole cream.  Follow-up if not improving.  - ketoconazole (NIZORAL) 2 % Cream; Apply 1 Application topically every day.  Dispense: 15 g; Refill: 0      Follow-up: No follow-ups on file.

## 2022-09-06 ENCOUNTER — OFFICE VISIT (OUTPATIENT)
Dept: MEDICAL GROUP | Facility: LAB | Age: 57
End: 2022-09-06
Payer: COMMERCIAL

## 2022-09-06 VITALS
WEIGHT: 212.6 LBS | OXYGEN SATURATION: 96 % | HEIGHT: 73 IN | SYSTOLIC BLOOD PRESSURE: 114 MMHG | DIASTOLIC BLOOD PRESSURE: 78 MMHG | TEMPERATURE: 97 F | RESPIRATION RATE: 14 BRPM | BODY MASS INDEX: 28.18 KG/M2 | HEART RATE: 84 BPM

## 2022-09-06 DIAGNOSIS — M25.522 ELBOW PAIN, LEFT: ICD-10-CM

## 2022-09-06 DIAGNOSIS — R07.9 CHEST PAIN, UNSPECIFIED TYPE: ICD-10-CM

## 2022-09-06 DIAGNOSIS — R20.2 NUMBNESS AND TINGLING IN LEFT HAND: ICD-10-CM

## 2022-09-06 DIAGNOSIS — R20.0 NUMBNESS AND TINGLING IN LEFT HAND: ICD-10-CM

## 2022-09-06 PROCEDURE — 93000 ELECTROCARDIOGRAM COMPLETE: CPT | Performed by: FAMILY MEDICINE

## 2022-09-06 PROCEDURE — 99214 OFFICE O/P EST MOD 30 MIN: CPT | Performed by: FAMILY MEDICINE

## 2022-09-06 ASSESSMENT — FIBROSIS 4 INDEX: FIB4 SCORE: 1.34

## 2022-09-06 NOTE — PROGRESS NOTES
"Subjective:     CC: Elbow pain, fingers numb    HPI:   Jaja presents today with concern for numbness in left fingers 1 through 3 as well as left elbow pain.    For about the last 4 days he has had worsening numbness in fingers of the left hand.  Mainly fingers 2 and 3 but also in the thumb as well.  Has been trying anti-inflammatories and stretching.    He also has a throbbing left elbow pain that is mainly just proximal to the elbow, no obvious aggravating factors, no discrete injury.    He also mentions some right-sided chest pain with couple mild episodes earlier today.  States this is not severe and would not of mention and likely that it was possibly connected to his left sided pains.  No shortness of breath, no pleuritic pain, pain is nonexertional.  Reports it is different from pain he felt when he had NSTEMI secondary to coronary-myocardial bridge in 2019.    Medications, past medical history, allergies, and social history have been reviewed and updated.      Objective:       Exam:  /78 (BP Location: Left arm, Patient Position: Sitting, BP Cuff Size: Adult)   Pulse 84   Temp 36.1 °C (97 °F)   Resp 14   Ht 1.854 m (6' 1\")   Wt 96.4 kg (212 lb 9.6 oz)   SpO2 96%   BMI 28.05 kg/m²  Body mass index is 28.05 kg/m².    Constitutional: Alert. Well appearing. No distress.  Skin: Warm, dry, good turgor, no visible rashes.  Eye: Equal, round and reactive to light, conjunctiva clear, lids normal.  Neck: Positive Spurling sign on the left.  No midline C-spine tenderness.  Respiratory: Normal effort. Lungs are clear to auscultation bilaterally.  Cardiovascular: Regular rate and rhythm. Normal S1/S2. No murmurs, rubs or gallops.   Neuro: Moves all four extremities. No facial droop.  Negative Phalen's and Tinel's sign to the left hand.  MSK: No chest wall tenderness.  Mild tenderness over the left elbow lateral epicondyle but more discrete tenderness over the distal triceps.  Mild thenar wasting to the left " hand but strength is intact and to okay sign and thumb opposition.  Psych: Answers questions appropriately. Normal affect and mood.    EKG interpretation by me: NSR. HR is 81 . Axis is normal. No ST or QRS morphologic changes. No T-wave inversions. DC and QT intervals are normal. Quality of EKG is good.        Assessment & Plan:     57 y.o. male with the following -     1. Numbness and tingling in left hand  Numbness and tingling in left fingers 1-3.  Exam maneuvers for carpal tunnel are negative.  He also has some elbow pain and positive Spurling's test with occasional neck/upper shoulder pain.  Question whether this is carpal tunnel versus cervical in nature.  EMG ordered to further delineate.  - Referral to Neurodiagnostics (EEG,EP,EMG/NCS/DBS)    2. Elbow pain, left  Pain to the distal triceps with tenderness to the area as well as his left lateral epicondyle.  Unclear whether this is possibly connected to the numbness with a cervical source or separate issue.  We will hold on specific treatment for now pending EMG results.  Differential would also include triceps strain and tennis elbow.    3. Chest pain, unspecified type  Reports a few brief, mild episodes of chest pain earlier today, only mentioned because he was wondering if it is possibly connected to his other symptoms.  No exertional pain, no associated symptoms.  Reports much different pain than his prior NSTEMI that was likely secondary to coronary-myocardial bridge.  EKG is normal here today.  I think this is more likely musculoskeletal or GI in nature but discussed ER precautions for any continued or worsening pain.  We will hold on additional work-up for now.    My total time spent caring for the patient on the day of the encounter was 34 minutes.   This does not include time spent on separately billable procedures/tests.        Please note that this note was created using voice recognition software.

## 2022-10-12 ENCOUNTER — NON-PROVIDER VISIT (OUTPATIENT)
Dept: NEUROLOGY | Facility: MEDICAL CENTER | Age: 57
End: 2022-10-12
Attending: PSYCHIATRY & NEUROLOGY
Payer: COMMERCIAL

## 2022-10-12 DIAGNOSIS — R20.2 NUMBNESS AND TINGLING IN LEFT ARM: ICD-10-CM

## 2022-10-12 DIAGNOSIS — R20.0 NUMBNESS AND TINGLING IN LEFT ARM: ICD-10-CM

## 2022-10-12 PROCEDURE — 95886 MUSC TEST DONE W/N TEST COMP: CPT | Mod: 26 | Performed by: PSYCHIATRY & NEUROLOGY

## 2022-10-12 PROCEDURE — 95909 NRV CNDJ TST 5-6 STUDIES: CPT | Mod: 26 | Performed by: PSYCHIATRY & NEUROLOGY

## 2022-10-12 PROCEDURE — 95886 MUSC TEST DONE W/N TEST COMP: CPT | Performed by: PSYCHIATRY & NEUROLOGY

## 2022-10-12 PROCEDURE — 95909 NRV CNDJ TST 5-6 STUDIES: CPT | Performed by: PSYCHIATRY & NEUROLOGY

## 2022-10-12 NOTE — PROCEDURES
"NERVE CONDUCTION STUDIES AND ELECTROMYOGRAPHY REPORT  [facility]  10/12/22          IMPRESSION:  This is a normal study of the left median and ulnar nerves.  There is no electrophysiologic evidence of a left C5-T1 radiculopathy.  Consider repeat study if progressive symptoms.    Tish Montero MD  Neurology - Neurophysiology              REASON FOR REFERRAL:  Mr. Jaja Lugo 57 y.o. referred by Dr. Ran Story for evaluation of hand numbness.  Symptoms have been present for least 6 weeks and are described as numbness primarily affecting the second and third fingers.  He denies any significant neck pain.    Height: 6'1\"  Weight: 210 lbs    Symptom focused neurological exam shows reduced sensation to light touch on the left second and third fingers including the dorsum of the second finger.  Strength is intact in the left upper extremity.      ELECTRODIAGNOSTIC EXAMINATION:  Nerve conduction studies (NCS) and electromyography (EMG) are utilized to evaluate direct or indirect damage to the peripheral nervous system. NCS are performed to measure the nerve(s) response(s) to electrostimulation across a given nerve segment. EMG evaluates the passive and active electrical activity of the muscle(s) in question.  Muscles are innervated by specific peripheral nerves and roots. Often times, several nerves the muscle to be examined in order to determine the presence or absence of the disease process. Furthermore, nerves and muscles may need to be tested in a agoa-sl-ahvj comparison, as well as in additional extremities, as this may be crucial in characterizing the extent of the disease process, which may be diffuse or isolated and of varying degree of severity. The extent of the neurodiagnostic exam is justified as it may help arrive to a proper diagnosis, which ultimately may contribute to better management of the patient. Therefore, the nerves to muscles examined during the study were " medically necessary.    Unless otherwise noted, temperature of the extremity(s) study was monitored before and during the examination and remained between 32 and 36 degrees C for the upper extremities, and between 30 and 36 degrees C for the lower extremities. The patient tolerated testing well, without any complications.       NERVE CONDUCTION STUDY SUMMARY:  Selected nerves of the left upper extremity are studied.    Normal left median sensory and motor responses.    Normal left ulnar sensory and motor responses.  Normal left median/ulnar palmar comparison.      NEEDLE EMG SUMMARY:  Concentric needle study of selected left upper extremity muscles is performed.     Insertion activity is normal in all muscles sampled.   With activation, there are normal morphology (amplitude/duration) motor unit action potentials firing with normal recruitment in muscles tested.       PATIENT DATA TABLES  Nerve Conduction Studies     Stim Site NR Onset (ms) Norm Onset (ms) O-P Amp (µV) Norm O-P Amp Site1 Site2 Delta-P (ms) Dist (cm) Oziel (m/s) Norm Oziel (m/s)   Left Median Anti Sensory (2nd Digit)   Wrist    2.1 <3.6 23.0 >10 Wrist 2nd Digit 2.7 14.0 52 >50   Left Ulnar Anti Sensory (5th Digit)   Wrist    2.1 <3.1 13.5 >10 Wrist 5th Digit 2.6 14.0 54 >50        Stim Site NR Onset (ms) Norm Onset (ms) O-P Amp (mV) Norm O-P Amp Site1 Site2 Delta-0 (ms) Dist (cm) Oziel (m/s) Norm Oziel (m/s)   Left Median Motor (Abd Poll Brev)   Wrist    2.9 <4 9.0 >6 Elbow Wrist 4.9 28.0 57 >50   Elbow    7.8  7.2          Left Ulnar Motor (Abd Dig Min)   Wrist    2.4 <3.1 8.3 >7 B Elbow Wrist 3.9 21.0 54 >50   B Elbow    6.3  8.3  A Elbow B Elbow 1.4 10.0 71    A Elbow    7.7  8.2               Stim Site NR Peak (ms) Norm Peak (ms) P-T Amp (µV) Site1 Site2 Delta-P (ms) Norm Delta (ms)   Left Median/Ulnar Palm Comparison (Wrist - 8cm)   Median Palm    1.6 <2.3 53.9 Median Palm Ulnar Palm 0.0 <0.3   Ulnar Palm    1.6 <2.3 20.3       Ulnar Palm    1.7  7.0                       Electromyography     Side Muscle Nerve Root Ins Act Fibs Psw Amp Dur Poly Recrt Int Pat Comment   Left 1stDorInt Ulnar C8-T1 Nml Nml Nml Nml Nml 0 Nml Nml    Left PronatorTeres Median C6-7 Nml Nml Nml Nml Nml 0 Nml Nml    Left Biceps Musculocut C5-6 Nml Nml Nml Nml Nml 0 Nml Nml    Left Triceps Radial C6-7-8 Nml Nml Nml Nml Nml 0 Nml Nml    Left Deltoid Axillary C5-6 Nml Nml Nml Nml Nml 0 Nml Nml

## 2022-11-10 ENCOUNTER — OFFICE VISIT (OUTPATIENT)
Dept: MEDICAL GROUP | Facility: LAB | Age: 57
End: 2022-11-10
Payer: COMMERCIAL

## 2022-11-10 VITALS
TEMPERATURE: 97 F | HEART RATE: 90 BPM | RESPIRATION RATE: 14 BRPM | BODY MASS INDEX: 28.86 KG/M2 | DIASTOLIC BLOOD PRESSURE: 68 MMHG | HEIGHT: 73 IN | OXYGEN SATURATION: 94 % | SYSTOLIC BLOOD PRESSURE: 114 MMHG | WEIGHT: 217.8 LBS

## 2022-11-10 DIAGNOSIS — L30.9 DERMATITIS: ICD-10-CM

## 2022-11-10 DIAGNOSIS — L21.9 SEBORRHEIC DERMATITIS: ICD-10-CM

## 2022-11-10 DIAGNOSIS — H00.012 HORDEOLUM EXTERNUM OF RIGHT LOWER EYELID: ICD-10-CM

## 2022-11-10 PROCEDURE — 99214 OFFICE O/P EST MOD 30 MIN: CPT | Performed by: FAMILY MEDICINE

## 2022-11-10 RX ORDER — CICLOPIROX 1 G/100ML
SHAMPOO TOPICAL
Qty: 120 ML | Refills: 1 | Status: SHIPPED | OUTPATIENT
Start: 2022-11-10 | End: 2023-12-21

## 2022-11-10 RX ORDER — TRIAMCINOLONE ACETONIDE 1 MG/G
1 CREAM TOPICAL 2 TIMES DAILY
Qty: 28.4 G | Refills: 0 | Status: SHIPPED | OUTPATIENT
Start: 2022-11-10 | End: 2023-12-21

## 2022-11-10 ASSESSMENT — FIBROSIS 4 INDEX: FIB4 SCORE: 1.34

## 2022-11-10 NOTE — PROGRESS NOTES
"Subjective:     CC: Rash, stye    HPI:   Jaja presents today with multiple concerns.    Little improvement in seborrheic dermatitis to the scalp with ketoconazole shampoo.    He has also had a separate rash with itchy red plaque to the chest.  This is seen mild improvement with OTC cortisone cream.    He has been getting some styes to both eyes.    Medications, past medical history, allergies, and social history have been reviewed and updated.      Objective:       Exam:  /68 (BP Location: Left arm, Patient Position: Sitting, BP Cuff Size: Large adult)   Pulse 90   Temp 36.1 °C (97 °F)   Resp 14   Ht 1.854 m (6' 1\")   Wt 98.8 kg (217 lb 12.8 oz)   SpO2 94%   BMI 28.74 kg/m²  Body mass index is 28.74 kg/m².    Constitutional: Alert. Well appearing. No distress.  Skin: Scaly erythema with dandruff to multiple areas of the scalp.  Well-defined erythematous plaque with mild scale to the chest.    Eye: Equal, round and reactive to light. Hordeolum to the right lower lid.  Respiratory: Normal effort.   Neuro: Moves all four extremities. No facial droop.  Psych: Answers questions appropriately. Normal affect and mood.      Assessment & Plan:     57 y.o. male with the following -     1. Seborrheic dermatitis  Little improvement with ketoconazole shampoo.  Will switch to ciclopirox shampoo.  - Ciclopirox 1 % Shampoo; Apply ~5 mL to wet hair; lather, and leave on hair and scalp for ~3 minutes; rinse. Use twice weekly for 4 weeks.  Dispense: 120 mL; Refill: 1    2. Dermatitis  Erythematous plaque with mild scale to the chest.  May be seborrheic dermatitis versus contact/atopic.  Mild improvement with OTC cortisone and will trial Kenalog cream.  Follow-up if not improving.  We did discuss possible dermatology referral/follow-up with his multiple skin issues continue.    - triamcinolone acetonide (KENALOG) 0.1 % Cream; Apply 1 Application topically 2 times a day.  Dispense: 28.4 g; Refill: 0    3. Hordeolum externum " of right lower eyelid  Warm compresses.    Please note that this note was created using voice recognition software.

## 2023-01-05 ENCOUNTER — OFFICE VISIT (OUTPATIENT)
Dept: MEDICAL GROUP | Facility: LAB | Age: 58
End: 2023-01-05
Payer: COMMERCIAL

## 2023-01-05 VITALS
RESPIRATION RATE: 12 BRPM | HEART RATE: 76 BPM | DIASTOLIC BLOOD PRESSURE: 76 MMHG | WEIGHT: 213.2 LBS | OXYGEN SATURATION: 95 % | HEIGHT: 73 IN | BODY MASS INDEX: 28.26 KG/M2 | SYSTOLIC BLOOD PRESSURE: 120 MMHG | TEMPERATURE: 97.2 F

## 2023-01-05 DIAGNOSIS — L30.9 DERMATITIS: ICD-10-CM

## 2023-01-05 PROCEDURE — 99214 OFFICE O/P EST MOD 30 MIN: CPT | Performed by: FAMILY MEDICINE

## 2023-01-05 RX ORDER — KETOCONAZOLE 20 MG/G
1 CREAM TOPICAL DAILY
Qty: 15 G | Refills: 0 | Status: SHIPPED
Start: 2023-01-05 | End: 2023-12-21

## 2023-01-05 RX ORDER — TERBINAFINE HYDROCHLORIDE 250 MG/1
250 TABLET ORAL DAILY
Qty: 30 TABLET | Refills: 3 | Status: SHIPPED
Start: 2023-01-05 | End: 2023-01-05

## 2023-01-05 RX ORDER — TERBINAFINE HYDROCHLORIDE 250 MG/1
250 TABLET ORAL DAILY
Qty: 7 TABLET | Refills: 0 | Status: SHIPPED | OUTPATIENT
Start: 2023-01-05 | End: 2023-01-12

## 2023-01-05 ASSESSMENT — FIBROSIS 4 INDEX: FIB4 SCORE: 1.34

## 2023-01-05 ASSESSMENT — PATIENT HEALTH QUESTIONNAIRE - PHQ9: CLINICAL INTERPRETATION OF PHQ2 SCORE: 0

## 2023-01-05 NOTE — PROGRESS NOTES
"Subjective:     CC: Rash    HPI:   Jaja presents today to follow-up on worsening rash.  Initially seen about 2 months ago with red plaque with mild scale to the chest along with flaky rash on the scalp consistent with seborrheic dermatitis.  Scalp symptoms did respond to antifungal shampoo.  Chest rash has worsened despite topical Kenalog treatment.  Continued rough scaly circular plaques on the chest along with similar but smaller circular patches on arms and legs.  All of it is itchy but not painful.    Medications, past medical history, allergies, and social history have been reviewed and updated.      Objective:       Exam:  /76 (BP Location: Right arm, Patient Position: Sitting, BP Cuff Size: Adult)   Pulse 76   Temp 36.2 °C (97.2 °F)   Resp 12   Ht 1.854 m (6' 1\")   Wt 96.7 kg (213 lb 3.2 oz)   SpO2 95%   BMI 28.13 kg/m²  Body mass index is 28.13 kg/m².    Constitutional: Alert. Well appearing. No distress.  Skin: Scaly erythema with dandruff to multiple areas of the scalp.  Well-defined erythematous plaques with scale to the chest.  Scattered erythematous circular patches with scale to the arms and legs.  Eye: Equal, round and reactive to light. Hordeolum to the right lower lid.  Respiratory: Normal effort.   Neuro: Moves all four extremities. No facial droop.  Psych: Answers questions appropriately. Normal affect and mood.    Assessment & Plan:     57 y.o. male with the following -     1. Dermatitis  Chest rash has worsened despite topical Kenalog treatment.  Continued rough scaly circular plaques on the chest along with similar but smaller circular patches on arms and legs.  Appears consistent with tinea corporis versus nummular eczema.  Would have expected at least some mild improvement with topical Kenalog cream with eczema so we will trial treatment with ketoconazole cream and short course of terbinafine.  I have also referred to dermatology for consultation.  If no improvement with " antifungal treatment would consider prednisone burst and high potency topical steroid for likely nummular eczema.  - ketoconazole (NIZORAL) 2 % Cream; Apply 1 Application topically every day.  Dispense: 15 g; Refill: 0  - terbinafine (LAMISIL) 250 MG Tab; Take 1 Tablet by mouth every day for 7 days.  Dispense: 7 Tablet; Refill: 0  - Referral to Dermatology      Please note that this note was created using voice recognition software.

## 2023-03-08 ENCOUNTER — PATIENT MESSAGE (OUTPATIENT)
Dept: MEDICAL GROUP | Facility: LAB | Age: 58
End: 2023-03-08
Payer: COMMERCIAL

## 2023-03-08 DIAGNOSIS — L30.9 DERMATITIS: ICD-10-CM

## 2023-05-15 RX ORDER — CARBAMAZEPINE 200 MG/1
TABLET ORAL
Qty: 90 TABLET | Refills: 3 | Status: SHIPPED | OUTPATIENT
Start: 2023-05-15 | End: 2024-02-08 | Stop reason: SDUPTHER

## 2023-05-15 NOTE — TELEPHONE ENCOUNTER
Received request via: Pharmacy    Was the patient seen in the last year in this department? Yes  1/5/23  Does the patient have an active prescription (recently filled or refills available) for medication(s) requested? No    Does the patient have custodial Plus and need 100 day supply (blood pressure, diabetes and cholesterol meds only)? Medication is not for cholesterol, blood pressure or diabetes

## 2023-09-17 DIAGNOSIS — N52.9 ERECTILE DYSFUNCTION, UNSPECIFIED ERECTILE DYSFUNCTION TYPE: ICD-10-CM

## 2023-09-18 RX ORDER — TADALAFIL 10 MG/1
10 TABLET ORAL PRN
Qty: 10 TABLET | Refills: 3 | Status: SHIPPED | OUTPATIENT
Start: 2023-09-18

## 2023-12-18 SDOH — HEALTH STABILITY: PHYSICAL HEALTH: ON AVERAGE, HOW MANY DAYS PER WEEK DO YOU ENGAGE IN MODERATE TO STRENUOUS EXERCISE (LIKE A BRISK WALK)?: 0 DAYS

## 2023-12-18 SDOH — ECONOMIC STABILITY: HOUSING INSECURITY: IN THE LAST 12 MONTHS, HOW MANY PLACES HAVE YOU LIVED?: 1

## 2023-12-18 SDOH — ECONOMIC STABILITY: INCOME INSECURITY: IN THE LAST 12 MONTHS, WAS THERE A TIME WHEN YOU WERE NOT ABLE TO PAY THE MORTGAGE OR RENT ON TIME?: NO

## 2023-12-18 SDOH — ECONOMIC STABILITY: TRANSPORTATION INSECURITY
IN THE PAST 12 MONTHS, HAS LACK OF RELIABLE TRANSPORTATION KEPT YOU FROM MEDICAL APPOINTMENTS, MEETINGS, WORK OR FROM GETTING THINGS NEEDED FOR DAILY LIVING?: NO

## 2023-12-18 SDOH — ECONOMIC STABILITY: FOOD INSECURITY: WITHIN THE PAST 12 MONTHS, THE FOOD YOU BOUGHT JUST DIDN'T LAST AND YOU DIDN'T HAVE MONEY TO GET MORE.: NEVER TRUE

## 2023-12-18 SDOH — ECONOMIC STABILITY: FOOD INSECURITY: WITHIN THE PAST 12 MONTHS, YOU WORRIED THAT YOUR FOOD WOULD RUN OUT BEFORE YOU GOT MONEY TO BUY MORE.: NEVER TRUE

## 2023-12-18 SDOH — ECONOMIC STABILITY: HOUSING INSECURITY
IN THE LAST 12 MONTHS, WAS THERE A TIME WHEN YOU DID NOT HAVE A STEADY PLACE TO SLEEP OR SLEPT IN A SHELTER (INCLUDING NOW)?: NO

## 2023-12-18 SDOH — HEALTH STABILITY: PHYSICAL HEALTH: ON AVERAGE, HOW MANY MINUTES DO YOU ENGAGE IN EXERCISE AT THIS LEVEL?: 0 MIN

## 2023-12-18 SDOH — HEALTH STABILITY: MENTAL HEALTH
STRESS IS WHEN SOMEONE FEELS TENSE, NERVOUS, ANXIOUS, OR CAN'T SLEEP AT NIGHT BECAUSE THEIR MIND IS TROUBLED. HOW STRESSED ARE YOU?: ONLY A LITTLE

## 2023-12-18 SDOH — ECONOMIC STABILITY: INCOME INSECURITY: HOW HARD IS IT FOR YOU TO PAY FOR THE VERY BASICS LIKE FOOD, HOUSING, MEDICAL CARE, AND HEATING?: NOT HARD AT ALL

## 2023-12-18 ASSESSMENT — SOCIAL DETERMINANTS OF HEALTH (SDOH)
HOW OFTEN DO YOU ATTEND CHURCH OR RELIGIOUS SERVICES?: NEVER
IN A TYPICAL WEEK, HOW MANY TIMES DO YOU TALK ON THE PHONE WITH FAMILY, FRIENDS, OR NEIGHBORS?: ONCE A WEEK
HOW OFTEN DO YOU HAVE A DRINK CONTAINING ALCOHOL: NEVER
HOW MANY DRINKS CONTAINING ALCOHOL DO YOU HAVE ON A TYPICAL DAY WHEN YOU ARE DRINKING: PATIENT DOES NOT DRINK
DO YOU BELONG TO ANY CLUBS OR ORGANIZATIONS SUCH AS CHURCH GROUPS UNIONS, FRATERNAL OR ATHLETIC GROUPS, OR SCHOOL GROUPS?: NO
HOW OFTEN DO YOU ATTENT MEETINGS OF THE CLUB OR ORGANIZATION YOU BELONG TO?: NEVER
HOW OFTEN DO YOU GET TOGETHER WITH FRIENDS OR RELATIVES?: ONCE A WEEK
DO YOU BELONG TO ANY CLUBS OR ORGANIZATIONS SUCH AS CHURCH GROUPS UNIONS, FRATERNAL OR ATHLETIC GROUPS, OR SCHOOL GROUPS?: NO
IN A TYPICAL WEEK, HOW MANY TIMES DO YOU TALK ON THE PHONE WITH FAMILY, FRIENDS, OR NEIGHBORS?: ONCE A WEEK
HOW OFTEN DO YOU HAVE SIX OR MORE DRINKS ON ONE OCCASION: NEVER
HOW OFTEN DO YOU GET TOGETHER WITH FRIENDS OR RELATIVES?: ONCE A WEEK
HOW OFTEN DO YOU ATTEND CHURCH OR RELIGIOUS SERVICES?: NEVER
HOW HARD IS IT FOR YOU TO PAY FOR THE VERY BASICS LIKE FOOD, HOUSING, MEDICAL CARE, AND HEATING?: NOT HARD AT ALL
WITHIN THE PAST 12 MONTHS, YOU WORRIED THAT YOUR FOOD WOULD RUN OUT BEFORE YOU GOT THE MONEY TO BUY MORE: NEVER TRUE
HOW OFTEN DO YOU ATTENT MEETINGS OF THE CLUB OR ORGANIZATION YOU BELONG TO?: NEVER

## 2023-12-18 ASSESSMENT — LIFESTYLE VARIABLES
SKIP TO QUESTIONS 9-10: 1
HOW OFTEN DO YOU HAVE A DRINK CONTAINING ALCOHOL: NEVER
HOW OFTEN DO YOU HAVE SIX OR MORE DRINKS ON ONE OCCASION: NEVER
AUDIT-C TOTAL SCORE: 0
HOW MANY STANDARD DRINKS CONTAINING ALCOHOL DO YOU HAVE ON A TYPICAL DAY: PATIENT DOES NOT DRINK

## 2023-12-21 ENCOUNTER — OFFICE VISIT (OUTPATIENT)
Dept: MEDICAL GROUP | Facility: LAB | Age: 58
End: 2023-12-21
Payer: COMMERCIAL

## 2023-12-21 ENCOUNTER — HOSPITAL ENCOUNTER (OUTPATIENT)
Dept: LAB | Facility: MEDICAL CENTER | Age: 58
End: 2023-12-21
Attending: FAMILY MEDICINE
Payer: COMMERCIAL

## 2023-12-21 VITALS
HEART RATE: 73 BPM | DIASTOLIC BLOOD PRESSURE: 76 MMHG | TEMPERATURE: 96.7 F | WEIGHT: 209 LBS | BODY MASS INDEX: 27.7 KG/M2 | RESPIRATION RATE: 12 BRPM | SYSTOLIC BLOOD PRESSURE: 124 MMHG | HEIGHT: 73 IN | OXYGEN SATURATION: 96 %

## 2023-12-21 DIAGNOSIS — N52.9 ERECTILE DYSFUNCTION, UNSPECIFIED ERECTILE DYSFUNCTION TYPE: ICD-10-CM

## 2023-12-21 DIAGNOSIS — Z12.5 SCREENING FOR PROSTATE CANCER: ICD-10-CM

## 2023-12-21 DIAGNOSIS — E78.5 DYSLIPIDEMIA: ICD-10-CM

## 2023-12-21 DIAGNOSIS — Z00.00 WELL ADULT EXAM: ICD-10-CM

## 2023-12-21 DIAGNOSIS — Z11.59 SCREENING FOR VIRAL DISEASE: ICD-10-CM

## 2023-12-21 DIAGNOSIS — Z23 NEED FOR VACCINATION: ICD-10-CM

## 2023-12-21 LAB
ALBUMIN SERPL BCP-MCNC: 4.9 G/DL (ref 3.2–4.9)
ALBUMIN/GLOB SERPL: 1.7 G/DL
ALP SERPL-CCNC: 76 U/L (ref 30–99)
ALT SERPL-CCNC: 26 U/L (ref 2–50)
ANION GAP SERPL CALC-SCNC: 10 MMOL/L (ref 7–16)
AST SERPL-CCNC: 26 U/L (ref 12–45)
BASOPHILS # BLD AUTO: 0.7 % (ref 0–1.8)
BASOPHILS # BLD: 0.04 K/UL (ref 0–0.12)
BILIRUB SERPL-MCNC: 0.4 MG/DL (ref 0.1–1.5)
BUN SERPL-MCNC: 18 MG/DL (ref 8–22)
CALCIUM ALBUM COR SERPL-MCNC: 9.4 MG/DL (ref 8.5–10.5)
CALCIUM SERPL-MCNC: 10.1 MG/DL (ref 8.5–10.5)
CHLORIDE SERPL-SCNC: 103 MMOL/L (ref 96–112)
CHOLEST SERPL-MCNC: 275 MG/DL (ref 100–199)
CO2 SERPL-SCNC: 28 MMOL/L (ref 20–33)
CREAT SERPL-MCNC: 0.94 MG/DL (ref 0.5–1.4)
EOSINOPHIL # BLD AUTO: 0.11 K/UL (ref 0–0.51)
EOSINOPHIL NFR BLD: 1.9 % (ref 0–6.9)
ERYTHROCYTE [DISTWIDTH] IN BLOOD BY AUTOMATED COUNT: 40.7 FL (ref 35.9–50)
EST. AVERAGE GLUCOSE BLD GHB EST-MCNC: 100 MG/DL
FASTING STATUS PATIENT QL REPORTED: NORMAL
GFR SERPLBLD CREATININE-BSD FMLA CKD-EPI: 94 ML/MIN/1.73 M 2
GLOBULIN SER CALC-MCNC: 2.9 G/DL (ref 1.9–3.5)
GLUCOSE SERPL-MCNC: 91 MG/DL (ref 65–99)
HBA1C MFR BLD: 5.1 % (ref 4–5.6)
HCT VFR BLD AUTO: 50.5 % (ref 42–52)
HCV AB SER QL: NORMAL
HDLC SERPL-MCNC: 48 MG/DL
HGB BLD-MCNC: 17.2 G/DL (ref 14–18)
HIV 1+2 AB+HIV1 P24 AG SERPL QL IA: NORMAL
IMM GRANULOCYTES # BLD AUTO: 0.02 K/UL (ref 0–0.11)
IMM GRANULOCYTES NFR BLD AUTO: 0.3 % (ref 0–0.9)
LDLC SERPL CALC-MCNC: 183 MG/DL
LYMPHOCYTES # BLD AUTO: 1.55 K/UL (ref 1–4.8)
LYMPHOCYTES NFR BLD: 26.9 % (ref 22–41)
MCH RBC QN AUTO: 31.1 PG (ref 27–33)
MCHC RBC AUTO-ENTMCNC: 34.1 G/DL (ref 32.3–36.5)
MCV RBC AUTO: 91.3 FL (ref 81.4–97.8)
MONOCYTES # BLD AUTO: 0.76 K/UL (ref 0–0.85)
MONOCYTES NFR BLD AUTO: 13.2 % (ref 0–13.4)
NEUTROPHILS # BLD AUTO: 3.29 K/UL (ref 1.82–7.42)
NEUTROPHILS NFR BLD: 57 % (ref 44–72)
NRBC # BLD AUTO: 0 K/UL
NRBC BLD-RTO: 0 /100 WBC (ref 0–0.2)
PLATELET # BLD AUTO: 186 K/UL (ref 164–446)
PMV BLD AUTO: 10 FL (ref 9–12.9)
POTASSIUM SERPL-SCNC: 4.7 MMOL/L (ref 3.6–5.5)
PROT SERPL-MCNC: 7.8 G/DL (ref 6–8.2)
PSA SERPL-MCNC: 0.45 NG/ML (ref 0–4)
RBC # BLD AUTO: 5.53 M/UL (ref 4.7–6.1)
SODIUM SERPL-SCNC: 141 MMOL/L (ref 135–145)
TRIGL SERPL-MCNC: 219 MG/DL (ref 0–149)
TSH SERPL DL<=0.005 MIU/L-ACNC: 1.54 UIU/ML (ref 0.38–5.33)
WBC # BLD AUTO: 5.8 K/UL (ref 4.8–10.8)

## 2023-12-21 PROCEDURE — 90471 IMMUNIZATION ADMIN: CPT | Performed by: FAMILY MEDICINE

## 2023-12-21 PROCEDURE — 90715 TDAP VACCINE 7 YRS/> IM: CPT | Performed by: FAMILY MEDICINE

## 2023-12-21 PROCEDURE — 86803 HEPATITIS C AB TEST: CPT

## 2023-12-21 PROCEDURE — 80053 COMPREHEN METABOLIC PANEL: CPT

## 2023-12-21 PROCEDURE — 84403 ASSAY OF TOTAL TESTOSTERONE: CPT

## 2023-12-21 PROCEDURE — 87389 HIV-1 AG W/HIV-1&-2 AB AG IA: CPT

## 2023-12-21 PROCEDURE — 84270 ASSAY OF SEX HORMONE GLOBUL: CPT

## 2023-12-21 PROCEDURE — 84443 ASSAY THYROID STIM HORMONE: CPT

## 2023-12-21 PROCEDURE — 83036 HEMOGLOBIN GLYCOSYLATED A1C: CPT

## 2023-12-21 PROCEDURE — 85025 COMPLETE CBC W/AUTO DIFF WBC: CPT

## 2023-12-21 PROCEDURE — 36415 COLL VENOUS BLD VENIPUNCTURE: CPT

## 2023-12-21 PROCEDURE — 3074F SYST BP LT 130 MM HG: CPT | Performed by: FAMILY MEDICINE

## 2023-12-21 PROCEDURE — 99396 PREV VISIT EST AGE 40-64: CPT | Mod: 25 | Performed by: FAMILY MEDICINE

## 2023-12-21 PROCEDURE — 3078F DIAST BP <80 MM HG: CPT | Performed by: FAMILY MEDICINE

## 2023-12-21 PROCEDURE — 80061 LIPID PANEL: CPT

## 2023-12-21 PROCEDURE — 84153 ASSAY OF PSA TOTAL: CPT

## 2023-12-21 PROCEDURE — 84402 ASSAY OF FREE TESTOSTERONE: CPT

## 2023-12-21 NOTE — PROGRESS NOTES
Subjective:     CC:   Chief Complaint   Patient presents with    Annual Exam    Requesting Labs       HPI:   Jaja Lugo is a 58 y.o. male who presents for an annual exam.     Last colonoscopy: 2017  Last Tdap: no record   Qualify for abdominal us screen: No  Qualify for hep c screen: ordered  Regular exercise: not currently  Diet: working on limiting take out    The 10-year ASCVD risk score (See SANCHEZ, et al., 2019) is: 8.2%    Mild flare of left-sided sciatica recently, he is seeing some improvement with conservative measures.    He  has a past medical history of [place] measles, Hyperlipidemia, and Seizure (HCC).    He has no past medical history of Chickenpox.  He  has no past surgical history on file.  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 10 Tablet 3    carBAMazepine (TEGRETOL) 200 MG Tab TAKE 1 TAB BY MOUTH EVERY DAY. IN THE EVENING 90 Tablet 3     No current facility-administered medications for this visit.    (including changes today)  Allergies: Patient has no known allergies.    Review of Systems   Constitutional: Negative for fever, chills and malaise/fatigue.   HENT: Negative for congestion.    Eyes: Negative for pain.   Respiratory:  "Negative for cough and shortness of breath.    Cardiovascular: Negative for leg swelling.   Gastrointestinal: Negative for nausea, vomiting, abdominal pain and diarrhea.   Genitourinary: Negative for dysuria and hematuria.   Skin: Negative for rash.   Neurological: Negative for dizziness, focal weakness and headaches.   Endo/Heme/Allergies: Does not bruise/bleed easily.   Psychiatric/Behavioral: Negative for depression.  The patient is not nervous/anxious.      Objective:     /76   Pulse 73   Temp 35.9 °C (96.7 °F) (Temporal)   Resp 12   Ht 1.854 m (6' 1\")   Wt 94.8 kg (209 lb)   SpO2 96%   BMI 27.57 kg/m²   Body mass index is 27.57 kg/m².  Wt Readings from Last 4 Encounters:   12/21/23 94.8 kg (209 lb)   01/05/23 96.7 kg (213 lb 3.2 oz)   11/10/22 98.8 kg (217 lb 12.8 oz)   09/06/22 96.4 kg (212 lb 9.6 oz)       Physical Exam:  Constitutional: Well-developed and well-nourished. Not diaphoretic. No distress.   Skin: Skin is warm and dry. No rash noted.  Head: Atraumatic without lesions.  Eyes: Conjunctivae and extraocular motions are normal. Pupils are equal, round, and reactive to light. No scleral icterus.   Ears:  External ears unremarkable.   Nose: Nares patent. Septum midline. Turbinates without erythema nor edema. No discharge.   Mouth/Throat: Dentition is normal. Tongue normal. Oropharynx is clear and moist. Posterior pharynx without erythema or exudates.  Neck: Supple, trachea midline.   Cardiovascular: Regular rate and rhythm, S1 and S2 without murmur, rubs, or gallops.    Chest: Effort normal. Clear to auscultation throughout.  Extremities: No cyanosis, clubbing, erythema, nor edema. Distal pulses intact and symmetric.   Musculoskeletal: All major joints AROM full in all directions without pain.  No midline lumbar paraspinal tenderness.  Neurological: Moves all 4 extremities.  No facial droop.  Psychiatric:  Behavior, mood, and affect are appropriate.    Assessment and Plan:     1. Well adult " exam  Health maintenance reviewed and updated.  Age-appropriate anticipatory guidance provided.  - CBC WITH DIFFERENTIAL; Future  - Comp Metabolic Panel; Future  - HEMOGLOBIN A1C; Future  - Lipid Profile; Future  - TSH WITH REFLEX TO FT4; Future    2. Dyslipidemia  He prefers to avoid statins if possible, rechecking lipids, plan for CAC score if still elevated.  Discussed lifestyle measures as well.  - Lipid Profile; Future  - CT-CARDIAC SCORING; Future    3. Screening for prostate cancer  - PROSTATE SPECIFIC AG SCREENING; Future    4. Erectile dysfunction, unspecified erectile dysfunction type  Continue Cialis, check TSH and testosterone.  - Testosterone, Free & Total, Adult Male (w/SHBG); Future  - TSH WITH REFLEX TO FT4; Future    5. Need for vaccination  - Tdap Vaccine =>6YO IM    6. Screening for viral disease  - HEP C VIRUS ANTIBODY; Future  - HIV AG/AB COMBO ASSAY SCREENING; Future    Follow-up: Return in about 1 year (around 12/21/2024), or if symptoms worsen or fail to improve.    Please note that this note was created using voice recognition software.

## 2023-12-21 NOTE — LETTER
[facility]  Ran Story M.D.  [address]  [place] [address]  Fax: [phone]   Authorization for Release/Disclosure of   Protected Health Information   Name: JAJA LUGO : 1965 SSN: [id number]   Address: [address] [place] [address]  [place] [address] Phone:    [phone] (home)    I authorize the entity listed below to release/disclose the PHI below to:   [facility]/Ran Story M.D. and Ran Story M.D.   Provider or Entity Name:       Address   City, State, Zip   Phone:      Fax:     Reason for request: continuity of care   Information to be released:    [  ] LAST COLONOSCOPY,  including any PATH REPORT and follow-up  [  ] LAST FIT/COLOGUARD RESULT [  ] LAST DEXA  [  ] LAST MAMMOGRAM  [  ] LAST PAP  [  ] LAST LABS [  ] RETINA EXAM REPORT  [  ] IMMUNIZATION RECORDS  [  ] Release all info      [  ] Check here and initial the line next to each item to release ALL health information INCLUDING  _____ Care and treatment for drug and / or alcohol abuse  _____ HIV testing, infection status, or AIDS  _____ Genetic Testing    DATES OF SERVICE OR TIME PERIOD TO BE DISCLOSED: _____________  I understand and acknowledge that:  * This Authorization may be revoked at any time by you in writing, except if your health information has already been used or disclosed.  * Your health information that will be used or disclosed as a result of you signing this authorization could be re-disclosed by the recipient. If this occurs, your re-disclosed health information may no longer be protected by State or Federal laws.  * You may refuse to sign this Authorization. Your refusal will not affect your ability to obtain treatment.  * This Authorization becomes effective upon signing and will  on (date) __________.      If no date is indicated, this Authorization will  one (1) year from the signature date.    Name: Jaja Lugo  Signature: Date:   2023      PLEASE FAX REQUESTED RECORDS BACK TO: [phone]

## 2023-12-23 LAB
SHBG SERPL-SCNC: 36 NMOL/L (ref 19–76)
TESTOST FREE MFR SERPL: 1.8 % (ref 1.6–2.9)
TESTOST FREE SERPL-MCNC: 67 PG/ML (ref 47–244)
TESTOST SERPL-MCNC: 385 NG/DL (ref 300–890)

## 2023-12-29 ENCOUNTER — APPOINTMENT (OUTPATIENT)
Dept: RADIOLOGY | Facility: MEDICAL CENTER | Age: 58
End: 2023-12-29
Attending: FAMILY MEDICINE
Payer: COMMERCIAL

## 2023-12-29 DIAGNOSIS — E78.5 DYSLIPIDEMIA: ICD-10-CM

## 2023-12-29 PROCEDURE — 4410556 CT-CARDIAC SCORING (SELF PAY ONLY)

## 2024-02-08 RX ORDER — CARBAMAZEPINE 200 MG/1
200 TABLET ORAL EVERY EVENING
Qty: 90 TABLET | Refills: 3 | Status: SHIPPED | OUTPATIENT
Start: 2024-02-08

## 2024-02-08 NOTE — TELEPHONE ENCOUNTER
Received request via: Pharmacy    Was the patient seen in the last year in this department? Yes  12/21/23  Does the patient have an active prescription (recently filled or refills available) for medication(s) requested? No    [facility]    Does the patient have [facility] Plus and need 100 day supply (blood pressure, diabetes and cholesterol meds only)? Medication is not for cholesterol, blood pressure or diabetes

## 2024-05-08 RX ORDER — CARBAMAZEPINE 200 MG/1
200 TABLET ORAL EVERY EVENING
Qty: 90 TABLET | Refills: 3 | Status: SHIPPED | OUTPATIENT
Start: 2024-05-08

## 2024-05-08 NOTE — TELEPHONE ENCOUNTER
Received request via: Pharmacy    Was the patient seen in the last year in this department? Yes12/21/23    Does the patient have an active prescription (recently filled or refills available) for medication(s) requested? No    Ph[facility] Name[facility]    Does the patient have [facility] and need 100 day supply (blood pressure, diabetes and cholesterol meds only)? Medication is not for cholesterol, blood pressure or diabetes

## 2024-05-15 DIAGNOSIS — N52.9 ERECTILE DYSFUNCTION, UNSPECIFIED ERECTILE DYSFUNCTION TYPE: ICD-10-CM

## 2024-05-15 RX ORDER — TADALAFIL 10 MG/1
10 TABLET ORAL PRN
Qty: 10 TABLET | Refills: 3 | Status: SHIPPED | OUTPATIENT
Start: 2024-05-15

## 2024-05-15 NOTE — TELEPHONE ENCOUNTER
Received request via: Patient    Was the patient seen in the last year in this department? Yes    Does the patient have an active prescription (recently filled or refills available) for medication(s) requested? No    Ph[facility] Name: [facility]    Does the patient have [facility] and need 100 day supply (blood pressure, diabetes and cholesterol meds only)? Patient does not have [facility]

## 2024-06-11 ENCOUNTER — TELEMEDICINE (OUTPATIENT)
Dept: MEDICAL GROUP | Facility: LAB | Age: 59
End: 2024-06-11
Payer: COMMERCIAL

## 2024-06-11 VITALS — WEIGHT: 210 LBS | BODY MASS INDEX: 27.83 KG/M2 | HEIGHT: 73 IN

## 2024-06-11 DIAGNOSIS — L81.9 PIGMENTED SKIN LESION: ICD-10-CM

## 2024-06-11 PROCEDURE — 99213 OFFICE O/P EST LOW 20 MIN: CPT | Mod: 95 | Performed by: FAMILY MEDICINE

## 2024-06-11 ASSESSMENT — FIBROSIS 4 INDEX: FIB4 SCORE: 1.59

## 2024-06-11 ASSESSMENT — PATIENT HEALTH QUESTIONNAIRE - PHQ9: CLINICAL INTERPRETATION OF PHQ2 SCORE: 0

## 2024-06-11 NOTE — PROGRESS NOTES
"Virtual Visit: Established Patient   This visit was conducted via Zoom using secure and encrypted videoconferencing technology.   The patient was in their home in the state [facility].    The patient's identity was confirmed and verbal consent was obtained for this virtual visit.    Subjective:   CC:   Chief Complaint   Patient presents with    Skin Discoloration     Brown spot on face; \"quite some time\"      Jaja Lugo is a 59 y.o. male presenting for evaluation and management of skin lesion.  Has had brown lesion to the right cheek for some time but it is growing in size recently.  No personal history of skin cancer but mother has had skin cancer.  Not established with dermatology, had been seen once for acute scalp issue.    ROS   See HPI    Current medicines (including changes today)  Current Outpatient Medications   Medication Sig Dispense Refill    tadalafil (CIALIS) 10 MG tablet Take 1 Tablet by mouth as needed for Erectile Dysfunction (Max once daily). 10 Tablet 3    carBAMazepine (TEGRETOL) 200 MG Tab TAKE 1 TABLET BY MOUTH EVERY DAY IN THE EVENING 90 Tablet 3     No current facility-administered medications for this visit.       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  Skin: No rashes in visible areas.  Previous picture sent of right cheek is reviewed.  There is a pigmented, raised lesion with irregular borders.  Eye: Round. Conjunctiva clear, lids normal. No icterus.   ENMT: Lips pink without lesions, good dentition, moist mucous membranes. Phonation normal.  Neck: No masses, no thyromegaly. Moves freely without pain.  Respiratory: Unlabored respiratory effort, no cough or " audible wheeze  Psych: Alert and oriented x3, normal affect and mood.     Assessment and Plan:   The following treatment plan was discussed:     1. Pigmented skin lesion  To the right cheek that is growing.  Limited evaluation given virtual visit but picture is reviewed.  Seborrheic keratosis versus atypical nevus.  Discussed further evaluation with dermatology, consider biopsy if warranted.  Referral was placed.  - Referral to Dermatology    HCC Gap Form    Diagnosis to address: G40.909 - Seizure disorder (HCC)  Assessment and plan: Chronic, stable. Continue with current defined treatment plan: Continue carbamazepine daily.. Follow-up at least annually.  Last edited 06/11/24 11:24 PDT by Ran Story M.D.           Follow-up: No follow-ups on file.

## 2024-06-20 ENCOUNTER — OFFICE VISIT (OUTPATIENT)
Dept: DERMATOLOGY | Facility: IMAGING CENTER | Age: 59
End: 2024-06-20

## 2024-06-20 DIAGNOSIS — L82.1 SEBORRHEIC KERATOSES: ICD-10-CM

## 2024-06-20 PROCEDURE — 99999 PR NO CHARGE: CPT | Performed by: STUDENT IN AN ORGANIZED HEALTH CARE EDUCATION/TRAINING PROGRAM

## 2024-06-20 NOTE — PROGRESS NOTES
[facility] Clinic Note    CC: SKs     HPI:  Jaja Lugo is a 59 y.o. male who presents today as new patient for evaluation and management of    Notes new growths on cheeks, temples, arm   Not symptomatic but would like removed   No history of skin cancer       ROS: no fevers/chills. Other pertinent positives and negatives as above.     Meds/PMH/PSH/FamHx/Allergies:   I have reviewed past medical history, surgical history, medications family history, allergies relevant to my specialty in the chart.       PHYSICAL EXAM:   A focused skin exam was completed of the head, arms with the following pertinent findings listed below.   Remaining above-listed examined areas within normal limits / negative for rashes or lesions.    - on right temple, right cheek n=2, left cheek, right forearm there are a total of 5 stuck on medium brown macules and papules with cerebriform appearance c/w Sks and macular SKs       IMPRESSION / PLAN:    Seborrheic keratosis  - Discussed benign nature   - Due to appearance, patient would like to proceed with removal today, aware of out of pocket fee not covered by insurance   - discussed to call office for any new/changing/symptomatic lesions for re-evaluation      Cryotherapy procedure note:  Discussed risks and benefits of cryotherapy. Patient verbally agreed. Area cleaned with alcohol swab, 2 applications of cryotherapy were applied for 10 seconds each to 5 lesion/s on location as noted in physical exam. Patient tolerated procedure well. Aftercare instructions given - recommend topical vaseline and bandage until healed. Avoid manipulation of area.       Follow up:  ninfa Lamas MD   [facility]

## 2024-08-13 ENCOUNTER — PATIENT MESSAGE (OUTPATIENT)
Dept: HEALTH INFORMATION MANAGEMENT | Facility: OTHER | Age: 59
End: 2024-08-13

## 2024-08-13 ASSESSMENT — PATIENT HEALTH QUESTIONNAIRE - PHQ9
2. FEELING DOWN, DEPRESSED, IRRITABLE, OR HOPELESS: NOT AT ALL
1. LITTLE INTEREST OR PLEASURE IN DOING THINGS: NOT AT ALL

## 2024-08-13 ASSESSMENT — ENCOUNTER SYMPTOMS: GENERAL WELL-BEING: GOOD

## 2024-08-13 ASSESSMENT — ACTIVITIES OF DAILY LIVING (ADL): BATHING_REQUIRES_ASSISTANCE: 0

## 2024-08-15 NOTE — ASSESSMENT & PLAN NOTE
Chronic, stable. He has not had a seizure in 20 years. He maintains on carbamazepine 200 mg daily. He would be interested in discontinuing this medication if he does not need it anymore. Follow up with PCP at least annually for continued monitoring and management.

## 2024-08-15 NOTE — ASSESSMENT & PLAN NOTE
Chronic, stable. Last lipid panel in Dec 2023. He is not currently on any lipid-lowering medications. He had a CT cardiac score done around the same time which was 0. We discussed his dietary/lifestyle regimen. Follow up with PCP for continued monitoring and management.  Lab Results   Component Value Date/Time    CHOLSTRLTOT 275 (H) 12/21/2023 08:27 AM    TRIGLYCERIDE 219 (H) 12/21/2023 08:27 AM    HDL 48 12/21/2023 08:27 AM     (H) 12/21/2023 08:27 AM

## 2024-08-16 ENCOUNTER — OFFICE VISIT (OUTPATIENT)
Dept: FAMILY PLANNING/WOMEN'S HEALTH CLINIC | Facility: PHYSICIAN GROUP | Age: 59
End: 2024-08-16
Attending: FAMILY MEDICINE
Payer: COMMERCIAL

## 2024-08-16 VITALS
DIASTOLIC BLOOD PRESSURE: 68 MMHG | BODY MASS INDEX: 26.77 KG/M2 | HEIGHT: 73 IN | WEIGHT: 202 LBS | SYSTOLIC BLOOD PRESSURE: 120 MMHG

## 2024-08-16 DIAGNOSIS — G40.909 SEIZURE DISORDER (HCC): ICD-10-CM

## 2024-08-16 DIAGNOSIS — E78.5 DYSLIPIDEMIA: ICD-10-CM

## 2024-08-16 PROCEDURE — 3074F SYST BP LT 130 MM HG: CPT

## 2024-08-16 PROCEDURE — 3078F DIAST BP <80 MM HG: CPT

## 2024-08-16 PROCEDURE — G0439 PPPS, SUBSEQ VISIT: HCPCS

## 2024-08-16 PROCEDURE — 1126F AMNT PAIN NOTED NONE PRSNT: CPT

## 2024-08-16 SDOH — SOCIAL STABILITY: SOCIAL INSECURITY: WITHIN THE LAST YEAR, HAVE YOU BEEN HUMILIATED OR EMOTIONALLY ABUSED IN OTHER WAYS BY YOUR PARTNER OR EX-PARTNER?: NO

## 2024-08-16 SDOH — SOCIAL STABILITY: SOCIAL INSECURITY
WITHIN THE LAST YEAR, HAVE YOU BEEN KICKED, HIT, SLAPPED, OR OTHERWISE PHYSICALLY HURT BY YOUR PARTNER OR EX-PARTNER?: NO

## 2024-08-16 SDOH — SOCIAL STABILITY: SOCIAL INSECURITY
WITHIN THE LAST YEAR, HAVE TO BEEN RAPED OR FORCED TO HAVE ANY KIND OF SEXUAL ACTIVITY BY YOUR PARTNER OR EX-PARTNER?: NO

## 2024-08-16 SDOH — SOCIAL STABILITY: SOCIAL INSECURITY: WITHIN THE LAST YEAR, HAVE YOU BEEN AFRAID OF YOUR PARTNER OR EX-PARTNER?: NO

## 2024-08-16 ASSESSMENT — PATIENT HEALTH QUESTIONNAIRE - PHQ9: CLINICAL INTERPRETATION OF PHQ2 SCORE: 0

## 2024-08-16 ASSESSMENT — FIBROSIS 4 INDEX: FIB4 SCORE: 1.59

## 2024-08-16 ASSESSMENT — PAIN SCALES - GENERAL: PAINLEVEL: NO PAIN

## 2024-08-16 NOTE — PROGRESS NOTES
Comprehensive Health Assessment Program     Jaja Lugo is a 59 y.o. here for his comprehensive health assessment.    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 10 Tablet 3    carBAMazepine (TEGRETOL) 200 MG Tab TAKE 1 TABLET BY MOUTH EVERY DAY IN THE EVENING 90 Tablet 3     No current facility-administered medications for this visit.          Current supplements as per medication list.     Allergies:   Patient has no known allergies.  Social History     Tobacco Use    Smoking status: Never    Smokeless tobacco: Never   Vaping Use    Vaping status: Never Used   Substance Use Topics    Alcohol use: Not Currently     Comment: Rarely    Drug use: Never     Family History   Problem Relation Age of Onset    Diabetes Father     Stroke Paternal Grandmother     Diabetes Paternal Grandfather     Stroke Maternal Grandfather     Sleep Apnea Neg Hx      Jaja  has a past medical history of [place] measles, Hyperlipidemia, and Seizure (HCC).    He has no past medical history of Chickenpox.   History reviewed. No pertinent surgical history.    Screening:    Depression Screening  Little interest or pleasure in doing things?  0 - not at all  Feeling down, depressed , or hopeless? 0 - not at all  Patient Health Questionnaire Score: 0     If depressive symptoms identified deferred to follow up visit unless specifically addressed in assessment and plan.    Interpretation of PHQ-9 Total Score   Score Severity   1-4 No Depression   5-9 Mild Depression   10-14 Moderate Depression   15-19 Moderately Severe Depression   20-27 Severe Depression    Screening for Cognitive  Impairment  Do you or any of your friends or family members have any concern about your memory? No  Cognitive concerns identified deferred for follow up unless specifically addressed in assessment and plan.    Fall Risk Assessment  Has the patient had two or more falls in the last year or any fall with injury in the last year?  No    Safety Assessment  Do you always wear your seatbelt?  Yes  Any changes to home needed to function safely? No  Difficulty hearing.  No  Patient counseled about all safety risks that were identified.    Functional Assessment ADLs  Are there any barriers preventing you from cooking for yourself or meeting nutritional needs?  No.    Are there any barriers preventing you from driving safely or obtaining transportation?  No.    Are there any barriers preventing you from using a telephone or calling for help?  No    Are there any barriers preventing you from shopping?  No.    Are there any barriers preventing you from taking care of your own finances?  No    Are there any barriers preventing you from managing your medications?  No    Are there any barriers preventing you from showering, bathing or dressing yourself? No    Are there any barriers preventing you from doing housework or laundry? No  Are there any barriers preventing you from using the toilet?No  Are you currently engaging in any exercise or physical activity?  No.   Standing desk; yard work.     Self-Assessment of Health  What is your perception of your health? Good    Do you sleep more than six hours a night? Yes    In the past 7 days, how much did pain keep you from doing your normal work? None    Do you spend quality time with family or friends (virtually or in person)? Yes    Do you usually eat a heart healthy diet that constists of a variety of fruits, vegetables, whole grains and fiber? Yes    Do you eat foods high in fat and/or Fast Food more than three times per week? No    How concerned are you that your medical  conditions are not being well managed? a little    Are you worried that in the next 2 months, you may not have stable housing that you own, rent, or stay in as part of a household? No      Advance Care Planning  Do you have an Advance Directive, Living Will, Durable Power of , or POLST? No                 Health Maintenance Summary            Overdue - Hepatitis B Vaccine (Hep B) (1 of 3 - 19+ 3-dose series) Never done      No completion history exists for this topic.              Overdue - COVID-19 Vaccine (5 - 2023-24 season) Overdue since 9/1/2023 05/21/2022  Imm Admin: PFIZER TEE CAP SARS-COV-2 VACCINATION (12+)    12/08/2021  Imm Admin: PFIZER PURPLE CAP SARS-COV-2 VACCINATION (12+)    04/08/2021  Imm Admin: PFIZER PURPLE CAP SARS-COV-2 VACCINATION (12+)    03/17/2021  Imm Admin: PFIZER PURPLE CAP SARS-COV-2 VACCINATION (12+)              Influenza Vaccine (1) Next due on 9/1/2024      10/07/2022  Imm Admin: Influenza Vac Subunit Quad Inj (Pf)    09/23/2021  Imm Admin: Influenza Vac Subunit Quad Inj (Pf)    09/12/2020  Imm Admin: Influenza Vaccine Quad Inj (Pf)    09/27/2019  Imm Admin: Influenza Vaccine Quad Inj (Pf)              Colorectal Cancer Screening (Colonoscopy - Every 10 Years) Next due on 1/1/2027 01/01/2017  Colonoscopy (Done)              IMM DTaP/Tdap/Td Vaccine (2 - Td or Tdap) Next due on 12/21/2033 12/21/2023  Imm Admin: Tdap Vaccine              Zoster (Shingles) Vaccines (Series Information) Completed      02/22/2021  Imm Admin: Zoster Vaccine Recombinant (RZV) (SHINGRIX)    09/12/2020  Imm Admin: Zoster Vaccine Recombinant (RZV) (SHINGRIX)    09/11/2020  Imm Admin: Zoster Vaccine Recombinant (RZV) (SHINGRIX)              Hepatitis C Screening  Completed      12/21/2023  Hepatitis C Antibody component of HEP C VIRUS ANTIBODY              Hepatitis A Vaccine (Hep A) (Series Information) Aged Out      No completion history exists for this topic.              HPV  "Vaccines (Series Information) Aged Out      No completion history exists for this topic.              Polio Vaccine (Inactivated Polio) (Series Information) Aged Out      No completion history exists for this topic.              Meningococcal Immunization (Series Information) Aged Out      No completion history exists for this topic.              Pneumococcal Vaccine: 0-64 Years (Series Information) Aged Out      No completion history exists for this topic.                    Patient Care Team:  Ran Story M.D. as PCP - General (Family Medicine)      Financial Resource Strain: Low Risk  (12/18/2023)    Overall Financial Resource Strain (CARDIA)     Difficulty of Paying Living Expenses: Not hard at all      Transportation Needs: No Transportation Needs (12/18/2023)    PRAPARE - Transportation     Lack of Transportation (Medical): No     Lack of Transportation (Non-Medical): No      Food Insecurity: No Food Insecurity (12/18/2023)    Hunger Vital Sign     Worried About Running Out of Food in the Last Year: Never true     Ran Out of Food in the Last Year: Never true     Intimate Partner Violence: Not At Risk (8/16/2024)    Humiliation, Afraid, Rape, and Kick questionnaire     Fear of Current or Ex-Partner: No     Emotionally Abused: No     Physically Abused: No     Sexually Abused: No         Encounter Vitals  Blood Pressure: 120/68  Weight: 91.6 kg (202 lb)  Height: 185.4 cm (6' 1\")  BMI (Calculated): 26.65  Pain Score: No pain     Alert, oriented in no acute distress.  Eye contact is good, speech goal directed, affect calm.    Assessment and Plan. The following treatment and monitoring plan is recommended:  History of seizure disorder (HCC)  Chronic, stable. He has not had a seizure in 20 years. He maintains on carbamazepine 200 mg daily. He would be interested in discontinuing this medication if he does not need it anymore. Follow up with PCP at least annually for continued monitoring and " management.      Dyslipidemia  Chronic, stable. Last lipid panel in Dec 2023. He is not currently on any lipid-lowering medications. He had a CT cardiac score done around the same time which was 0. We discussed his dietary/lifestyle regimen. Follow up with PCP for continued monitoring and management.  Lab Results   Component Value Date/Time    CHOLSTRLTOT 275 (H) 12/21/2023 08:27 AM    TRIGLYCERIDE 219 (H) 12/21/2023 08:27 AM    HDL 48 12/21/2023 08:27 AM     (H) 12/21/2023 08:27 AM         Services suggested: No services needed at this time    Follow-up: Return for appointment with Primary Care Provider as needed..

## 2024-08-16 NOTE — PROGRESS NOTES
Comprehensive Health Assessment Program     Jaja Lugo is a 59 y.o. here for his comprehensive health assessment.    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 10 Tablet 3    carBAMazepine (TEGRETOL) 200 MG Tab TAKE 1 TABLET BY MOUTH EVERY DAY IN THE EVENING 90 Tablet 3     No current facility-administered medications for this visit.          Current supplements as per medication list.     Allergies:   Patient has no known allergies.  Social History     Tobacco Use    Smoking status: Never    Smokeless tobacco: Never   Vaping Use    Vaping status: Never Used   Substance Use Topics    Alcohol use: Not Currently     Comment: Rarely    Drug use: Never     Family History   Problem Relation Age of Onset    Diabetes Father     Stroke Paternal Grandmother     Diabetes Paternal Grandfather     Stroke Maternal Grandfather     Sleep Apnea Neg Hx      Jaja  has a past medical history of [place] measles, Hyperlipidemia, and Seizure (HCC).    He has no past medical history of Chickenpox.   History reviewed. No pertinent surgical history.    Screening:  In the last six months have you experienced any leakage of urine? {YES / NO:05404}    Depression Screening  Little interest or pleasure in doing things?  0 - not at all  Feeling down, depressed , or hopeless? 0 - not at all  Patient Health Questionnaire Score: 0     If depressive symptoms identified deferred to follow up visit unless specifically addressed in assessment and plan.    Interpretation of PHQ-9 Total Score   Score Severity   1-4 No Depression   5-9 Mild Depression   10-14 Moderate Depression   15-19  Moderately Severe Depression   20-27 Severe Depression    Screening for Cognitive Impairment  Do you or any of your friends or family members have any concern about your memory? No  Three Minute Recall (Leader, Season, Table)  /3    [place]u clock face with all 12 numbers and set the hands to show 10 minutes after 11.       Cognitive concerns identified deferred for follow up unless specifically addressed in assessment and plan.    Fall Risk Assessment  Has the patient had two or more falls in the last year or any fall with injury in the last year?  No    Safety Assessment  Do you always wear your seatbelt?  Yes  Any changes to home needed to function safely? No  Difficulty hearing.  No  Patient counseled about all safety risks that were identified.    Functional Assessment ADLs  Are there any barriers preventing you from cooking for yourself or meeting nutritional needs?  No.    Are there any barriers preventing you from driving safely or obtaining transportation?  No.    Are there any barriers preventing you from using a telephone or calling for help?  No    Are there any barriers preventing you from shopping?  No.    Are there any barriers preventing you from taking care of your own finances?  No    Are there any barriers preventing you from managing your medications?  No    Are there any barriers preventing you from showering, bathing or dressing yourself? No    Are there any barriers preventing you from doing housework or laundry? No  Are there any barriers preventing you from using the toilet?No  Are you currently engaging in any exercise or physical activity?  No.      Self-Assessment of Health  What is your perception of your health? Good    Do you sleep more than six hours a night? Yes    In the past 7 days, how much did pain keep you from doing your normal work? None    Do you spend quality time with family or friends (virtually or in person)? Yes    Do you usually eat a heart healthy diet that constists of a  variety of fruits, vegetables, whole grains and fiber? Yes    Do you eat foods high in fat and/or Fast Food more than three times per week? No    How concerned are you that your medical conditions are not being well managed? a little    Are you worried that in the next 2 months, you may not have stable housing that you own, rent, or stay in as part of a household? No      Advance Care Planning  Do you have an Advance Directive, Living Will, Durable Power of , or POLST? No                 Health Maintenance Summary            Overdue - Hepatitis B Vaccine (Hep B) (1 of 3 - 19+ 3-dose series) Never done      No completion history exists for this topic.              Overdue - COVID-19 Vaccine (5 - 2023-24 season) Overdue since 9/1/2023 05/21/2022  Imm Admin: PFIZER TEE CAP SARS-COV-2 VACCINATION (12+)    12/08/2021  Imm Admin: PFIZER PURPLE CAP SARS-COV-2 VACCINATION (12+)    04/08/2021  Imm Admin: PFIZER PURPLE CAP SARS-COV-2 VACCINATION (12+)    03/17/2021  Imm Admin: PFIZER PURPLE CAP SARS-COV-2 VACCINATION (12+)              Influenza Vaccine (1) Next due on 9/1/2024      10/07/2022  Imm Admin: Influenza Vac Subunit Quad Inj (Pf)    09/23/2021  Imm Admin: Influenza Vac Subunit Quad Inj (Pf)    09/12/2020  Imm Admin: Influenza Vaccine Quad Inj (Pf)    09/27/2019  Imm Admin: Influenza Vaccine Quad Inj (Pf)              Colorectal Cancer Screening (Colonoscopy - Every 10 Years) Next due on 1/1/2027 01/01/2017  Colonoscopy (Done)              IMM DTaP/Tdap/Td Vaccine (2 - Td or Tdap) Next due on 12/21/2033 12/21/2023  Imm Admin: Tdap Vaccine              Zoster (Shingles) Vaccines (Series Information) Completed      02/22/2021  Imm Admin: Zoster Vaccine Recombinant (RZV) (SHINGRIX)    09/12/2020  Imm Admin: Zoster Vaccine Recombinant (RZV) (SHINGRIX)    09/11/2020  Imm Admin: Zoster Vaccine Recombinant (RZV) (SHINGRIX)              Hepatitis C Screening  Completed      12/21/2023  Hepatitis C  "Antibody component of HEP C VIRUS ANTIBODY              Hepatitis A Vaccine (Hep A) (Series Information) Aged Out      No completion history exists for this topic.              HPV Vaccines (Series Information) Aged Out      No completion history exists for this topic.              Polio Vaccine (Inactivated Polio) (Series Information) Aged Out      No completion history exists for this topic.              Meningococcal Immunization (Series Information) Aged Out      No completion history exists for this topic.              Pneumococcal Vaccine: 0-64 Years (Series Information) Aged Out      No completion history exists for this topic.                    Patient Care Team:  Ran Story M.D. as PCP - General (Family Medicine)      Financial Resource Strain: Low Risk  (12/18/2023)    Overall Financial Resource Strain (CARDIA)     Difficulty of Paying Living Expenses: Not hard at all      Transportation Needs: No Transportation Needs (12/18/2023)    PRAPARE - Transportation     Lack of Transportation (Medical): No     Lack of Transportation (Non-Medical): No      Food Insecurity: No Food Insecurity (12/18/2023)    Hunger Vital Sign     Worried About Running Out of Food in the Last Year: Never true     Ran Out of Food in the Last Year: Never true        Encounter Vitals  Blood Pressure: 120/68  Weight: 91.6 kg (202 lb)  Height: 185.4 cm (6' 1\")  BMI (Calculated): 26.65  Pain Score: No pain     Alert, oriented in no acute distress.  Eye contact is good, speech goal directed, affect calm.    Assessment and Plan. The following treatment and monitoring plan is recommended:  History of seizure disorder (HCC)  Chronic, stable. He has not had a seizure since ___. He maintains on carbamazepine 200 mg daily. Follow up with PCP at least annually for continued monitoring and management.      Dyslipidemia  Chronic, stable. Last lipid panel in Dec 2023. He is not currently on any lipid-lowering medications. He had a CT " cardiac score done around the same time which was 0. We discussed his dietary/lifestyle regimen. Follow up with PCP for continued monitoring and management.  Lab Results   Component Value Date/Time    CHOLSTRLTOT 275 (H) 12/21/2023 08:27 AM    TRIGLYCERIDE 219 (H) 12/21/2023 08:27 AM    HDL 48 12/21/2023 08:27 AM     (H) 12/21/2023 08:27 AM         Services suggested: { AWV COORDINATION OF SERVICES:20405}  Health Care Screening: Age-appropriate preventive services recommended by USPTF and ACIP covered by Medicare were discussed today. Services ordered if indicated and agreed upon by the patient.  Referrals offered: Community-based lifestyle interventions to reduce health risks and promote self-management and wellness, fall prevention, nutrition, physical activity, tobacco-use cessation, weight loss, and mental health services as per orders if indicated.    Discussion today about general wellness and lifestyle habits:    Prevent falls and reduce trip hazards; Cautioned about securing or removing rugs.  Have a working fire alarm and carbon monoxide detector.  Engage in regular physical activity and social activities.    Follow-up: No follow-ups on file.

## 2025-03-17 RX ORDER — CARBAMAZEPINE 200 MG/1
200 TABLET ORAL EVERY EVENING
Qty: 90 TABLET | Refills: 0 | Status: SHIPPED | OUTPATIENT
Start: 2025-03-17

## 2025-05-23 RX ORDER — CARBAMAZEPINE 200 MG/1
200 TABLET ORAL EVERY EVENING
Qty: 90 TABLET | Refills: 0 | Status: SHIPPED | OUTPATIENT
Start: 2025-05-23

## 2025-06-20 ENCOUNTER — APPOINTMENT (OUTPATIENT)
Dept: MEDICAL GROUP | Facility: LAB | Age: 60
End: 2025-06-20
Payer: COMMERCIAL

## 2025-06-20 ENCOUNTER — OFFICE VISIT (OUTPATIENT)
Dept: MEDICAL GROUP | Facility: LAB | Age: 60
End: 2025-06-20
Payer: COMMERCIAL

## 2025-06-20 VITALS
RESPIRATION RATE: 16 BRPM | SYSTOLIC BLOOD PRESSURE: 108 MMHG | HEIGHT: 73 IN | TEMPERATURE: 97.9 F | WEIGHT: 216 LBS | DIASTOLIC BLOOD PRESSURE: 74 MMHG | HEART RATE: 86 BPM | OXYGEN SATURATION: 95 % | BODY MASS INDEX: 28.63 KG/M2

## 2025-06-20 DIAGNOSIS — Z12.5 SCREENING FOR PROSTATE CANCER: ICD-10-CM

## 2025-06-20 DIAGNOSIS — Q24.5 CORONARY-MYOCARDIAL BRIDGE: ICD-10-CM

## 2025-06-20 DIAGNOSIS — Z00.00 WELL ADULT EXAM: Primary | ICD-10-CM

## 2025-06-20 DIAGNOSIS — R53.83 OTHER FATIGUE: ICD-10-CM

## 2025-06-20 DIAGNOSIS — G40.909 SEIZURE DISORDER (HCC): ICD-10-CM

## 2025-06-20 DIAGNOSIS — N52.9 ERECTILE DYSFUNCTION, UNSPECIFIED ERECTILE DYSFUNCTION TYPE: ICD-10-CM

## 2025-06-20 PROCEDURE — 99214 OFFICE O/P EST MOD 30 MIN: CPT | Performed by: FAMILY MEDICINE

## 2025-06-20 PROCEDURE — 3074F SYST BP LT 130 MM HG: CPT | Performed by: FAMILY MEDICINE

## 2025-06-20 PROCEDURE — 3078F DIAST BP <80 MM HG: CPT | Performed by: FAMILY MEDICINE

## 2025-06-20 RX ORDER — CARBAMAZEPINE 200 MG/1
200 TABLET ORAL EVERY EVENING
Qty: 90 TABLET | Refills: 3 | Status: SHIPPED | OUTPATIENT
Start: 2025-06-20 | End: 2025-06-20

## 2025-06-20 RX ORDER — CARBAMAZEPINE 200 MG/1
200 TABLET ORAL EVERY EVENING
Qty: 90 TABLET | Refills: 3 | Status: SHIPPED | OUTPATIENT
Start: 2025-06-20

## 2025-06-20 RX ORDER — TADALAFIL 10 MG/1
10 TABLET ORAL PRN
Qty: 30 TABLET | Refills: 3 | Status: SHIPPED | OUTPATIENT
Start: 2025-06-20

## 2025-06-20 ASSESSMENT — PATIENT HEALTH QUESTIONNAIRE - PHQ9: CLINICAL INTERPRETATION OF PHQ2 SCORE: 0

## 2025-06-20 ASSESSMENT — FIBROSIS 4 INDEX: FIB4 SCORE: 1.62

## 2025-06-20 NOTE — PROGRESS NOTES
"Subjective:     CC: Follow up, med refills    HPI:   Jaja presents today for follow-up and med refills.  He continues on carbamazepine for history of seizures.  No seizures for decades, these were thought due to a vascular malformation.  Needs carbamazepine refilled.  Also like refill of tadalafil.  He had been prescribed metoprolol years ago for coronary myocardial bridge but never ended up taking this and has never had recurrent symptoms, denies any chest pain.  Would like testosterone checked for persistent fatigue.    Current Medications[1]    Medications, past medical history, allergies, and social history have been reviewed and updated.      Objective:       Exam:  /74 (BP Location: Left arm, Patient Position: Sitting, BP Cuff Size: Large adult)   Pulse 86   Temp 36.6 °C (97.9 °F) (Temporal)   Resp 16   Ht 1.854 m (6' 1\")   Wt 98 kg (216 lb)   SpO2 95%   BMI 28.50 kg/m²  Body mass index is 28.5 kg/m².    Constitutional: Alert. Well appearing. No distress.  Skin: Warm, dry, good turgor, no visible rashes.  ENMT: Moist mucous membranes. Normal dentition.  Respiratory: Normal effort. Lungs are clear to auscultation bilaterally.  Cardiovascular: Regular rate and rhythm. Normal S1/S2. No murmurs, rubs or gallops.   Neuro: Moves all four extremities. No facial droop.  Psych: Answers questions appropriately. Normal affect and mood.    Assessment & Plan:     60 y.o. male with the following -     1. History of seizure disorder (HCC)  History of seizures secondary to vascular malformation.  Has been stable on carbamazepine without seizures for many years.  Continue current carbamazepine, check levels.  - CARBAMAZEPINE; Future  - carBAMazepine (TEGRETOL) 200 MG Tab; Take 1 Tablet by mouth every evening.  Dispense: 90 Tablet; Refill: 3    2. Erectile dysfunction, unspecified erectile dysfunction type  Continue tadalafil as needed.  - tadalafil (CIALIS) 10 MG tablet; Take 1 Tablet by mouth as needed for " Erectile Dysfunction (Max once daily).  Dispense: 30 Tablet; Refill: 3    3. Coronary-myocardial bridge  Had been prescribed metoprolol in the past but never took this.  Has had no recurrent symptoms.    4. Well adult exam (Primary)  - CBC WITH DIFFERENTIAL; Future  - Comp Metabolic Panel; Future  - Lipid Profile; Future  - HEMOGLOBIN A1C; Future    5. Screening for prostate cancer  - PROSTATE SPECIFIC AG SCREENING; Future    6. Other fatigue  Requesting testosterone level for fatigue, will also check TSH.  - Testosterone, Free & Total, Adult Male (w/SHBG); Future  - TSH WITH REFLEX TO FT4; Future      Please note that this note was created using voice recognition software.           [1]   Current Outpatient Medications   Medication Sig Dispense Refill    tadalafil (CIALIS) 10 MG tablet Take 1 Tablet by mouth as needed for Erectile Dysfunction (Max once daily). 30 Tablet 3    carBAMazepine (TEGRETOL) 200 MG Tab Take 1 Tablet by mouth every evening. 90 Tablet 3     No current facility-administered medications for this visit.      Sarecycline Counseling: Patient advised regarding possible photosensitivity and discoloration of the teeth, skin, lips, tongue and gums.  Patient instructed to avoid sunlight, if possible.  When exposed to sunlight, patients should wear protective clothing, sunglasses, and sunscreen.  The patient was instructed to call the office immediately if the following severe adverse effects occur:  hearing changes, easy bruising/bleeding, severe headache, or vision changes.  The patient verbalized understanding of the proper use and possible adverse effects of sarecycline.  All of the patient's questions and concerns were addressed.

## 2025-06-23 ENCOUNTER — HOSPITAL ENCOUNTER (OUTPATIENT)
Dept: LAB | Facility: MEDICAL CENTER | Age: 60
End: 2025-06-23
Attending: FAMILY MEDICINE
Payer: COMMERCIAL

## 2025-06-23 DIAGNOSIS — Z00.00 WELL ADULT EXAM: ICD-10-CM

## 2025-06-23 DIAGNOSIS — G40.909 SEIZURE DISORDER (HCC): ICD-10-CM

## 2025-06-23 DIAGNOSIS — R53.83 OTHER FATIGUE: ICD-10-CM

## 2025-06-23 DIAGNOSIS — Z12.5 SCREENING FOR PROSTATE CANCER: ICD-10-CM

## 2025-06-23 LAB
ALBUMIN SERPL BCP-MCNC: 4.6 G/DL (ref 3.2–4.9)
ALBUMIN/GLOB SERPL: 1.5 G/DL
ALP SERPL-CCNC: 56 U/L (ref 30–99)
ALT SERPL-CCNC: 32 U/L (ref 2–50)
ANION GAP SERPL CALC-SCNC: 11 MMOL/L (ref 7–16)
AST SERPL-CCNC: 24 U/L (ref 12–45)
BASOPHILS # BLD AUTO: 0.8 % (ref 0–1.8)
BASOPHILS # BLD: 0.04 K/UL (ref 0–0.12)
BILIRUB SERPL-MCNC: 0.4 MG/DL (ref 0.1–1.5)
BUN SERPL-MCNC: 20 MG/DL (ref 8–22)
CALCIUM ALBUM COR SERPL-MCNC: 9.1 MG/DL (ref 8.5–10.5)
CALCIUM SERPL-MCNC: 9.6 MG/DL (ref 8.5–10.5)
CARBAMAZEPINE SERPL-MCNC: 4.7 UG/ML (ref 4–12)
CHLORIDE SERPL-SCNC: 102 MMOL/L (ref 96–112)
CHOLEST SERPL-MCNC: 290 MG/DL (ref 100–199)
CO2 SERPL-SCNC: 26 MMOL/L (ref 20–33)
CREAT SERPL-MCNC: 1 MG/DL (ref 0.5–1.4)
EOSINOPHIL # BLD AUTO: 0.1 K/UL (ref 0–0.51)
EOSINOPHIL NFR BLD: 1.9 % (ref 0–6.9)
ERYTHROCYTE [DISTWIDTH] IN BLOOD BY AUTOMATED COUNT: 40.3 FL (ref 35.9–50)
EST. AVERAGE GLUCOSE BLD GHB EST-MCNC: 108 MG/DL
GFR SERPLBLD CREATININE-BSD FMLA CKD-EPI: 87 ML/MIN/1.73 M 2
GLOBULIN SER CALC-MCNC: 3 G/DL (ref 1.9–3.5)
GLUCOSE SERPL-MCNC: 92 MG/DL (ref 65–99)
HBA1C MFR BLD: 5.4 % (ref 4–5.6)
HCT VFR BLD AUTO: 48.2 % (ref 42–52)
HDLC SERPL-MCNC: 56 MG/DL
HGB BLD-MCNC: 16.4 G/DL (ref 14–18)
IMM GRANULOCYTES # BLD AUTO: 0.04 K/UL (ref 0–0.11)
IMM GRANULOCYTES NFR BLD AUTO: 0.8 % (ref 0–0.9)
LDLC SERPL CALC-MCNC: 199 MG/DL
LYMPHOCYTES # BLD AUTO: 1.39 K/UL (ref 1–4.8)
LYMPHOCYTES NFR BLD: 26.6 % (ref 22–41)
MCH RBC QN AUTO: 30.9 PG (ref 27–33)
MCHC RBC AUTO-ENTMCNC: 34 G/DL (ref 32.3–36.5)
MCV RBC AUTO: 90.8 FL (ref 81.4–97.8)
MONOCYTES # BLD AUTO: 0.65 K/UL (ref 0–0.85)
MONOCYTES NFR BLD AUTO: 12.4 % (ref 0–13.4)
NEUTROPHILS # BLD AUTO: 3.01 K/UL (ref 1.82–7.42)
NEUTROPHILS NFR BLD: 57.5 % (ref 44–72)
NRBC # BLD AUTO: 0 K/UL
NRBC BLD-RTO: 0 /100 WBC (ref 0–0.2)
PLATELET # BLD AUTO: 188 K/UL (ref 164–446)
PMV BLD AUTO: 10.4 FL (ref 9–12.9)
POTASSIUM SERPL-SCNC: 4.1 MMOL/L (ref 3.6–5.5)
PROT SERPL-MCNC: 7.6 G/DL (ref 6–8.2)
PSA SERPL DL<=0.01 NG/ML-MCNC: 0.44 NG/ML (ref 0–4)
RBC # BLD AUTO: 5.31 M/UL (ref 4.7–6.1)
SODIUM SERPL-SCNC: 139 MMOL/L (ref 135–145)
TRIGL SERPL-MCNC: 177 MG/DL (ref 0–149)
TSH SERPL DL<=0.005 MIU/L-ACNC: 1.62 UIU/ML (ref 0.38–5.33)
WBC # BLD AUTO: 5.2 K/UL (ref 4.8–10.8)

## 2025-06-23 PROCEDURE — 84403 ASSAY OF TOTAL TESTOSTERONE: CPT

## 2025-06-23 PROCEDURE — 84443 ASSAY THYROID STIM HORMONE: CPT

## 2025-06-23 PROCEDURE — 84153 ASSAY OF PSA TOTAL: CPT

## 2025-06-23 PROCEDURE — 85025 COMPLETE CBC W/AUTO DIFF WBC: CPT

## 2025-06-23 PROCEDURE — 36415 COLL VENOUS BLD VENIPUNCTURE: CPT

## 2025-06-23 PROCEDURE — 80053 COMPREHEN METABOLIC PANEL: CPT

## 2025-06-23 PROCEDURE — 80061 LIPID PANEL: CPT

## 2025-06-23 PROCEDURE — 84270 ASSAY OF SEX HORMONE GLOBUL: CPT

## 2025-06-23 PROCEDURE — 84402 ASSAY OF FREE TESTOSTERONE: CPT

## 2025-06-23 PROCEDURE — 80156 ASSAY CARBAMAZEPINE TOTAL: CPT

## 2025-06-23 PROCEDURE — 83036 HEMOGLOBIN GLYCOSYLATED A1C: CPT

## 2025-06-24 LAB
SHBG SERPL-SCNC: 28 NMOL/L (ref 19–76)
TESTOST FREE MFR SERPL: 2 % (ref 1.6–2.9)
TESTOST FREE SERPL-MCNC: 79 PG/ML (ref 47–244)
TESTOST SERPL-MCNC: 395 NG/DL (ref 300–890)

## 2025-06-25 ENCOUNTER — RESULTS FOLLOW-UP (OUTPATIENT)
Dept: MEDICAL GROUP | Facility: LAB | Age: 60
End: 2025-06-25
Payer: COMMERCIAL